# Patient Record
Sex: FEMALE | Race: WHITE | NOT HISPANIC OR LATINO | Employment: UNEMPLOYED | URBAN - METROPOLITAN AREA
[De-identification: names, ages, dates, MRNs, and addresses within clinical notes are randomized per-mention and may not be internally consistent; named-entity substitution may affect disease eponyms.]

---

## 2024-04-17 DIAGNOSIS — Z64.1 GRAND MULTIPARITY: ICD-10-CM

## 2024-04-17 DIAGNOSIS — O10.913 CHRONIC HYPERTENSION COMPLICATING OR REASON FOR CARE DURING PREGNANCY, THIRD TRIMESTER: Primary | ICD-10-CM

## 2024-04-18 ENCOUNTER — OFFICE VISIT (OUTPATIENT)
Dept: MATERNAL FETAL MEDICINE | Facility: CLINIC | Age: 44
End: 2024-04-18
Payer: MEDICAID

## 2024-04-18 ENCOUNTER — PROCEDURE VISIT (OUTPATIENT)
Dept: MATERNAL FETAL MEDICINE | Facility: CLINIC | Age: 44
End: 2024-04-18
Payer: MEDICAID

## 2024-04-18 VITALS
DIASTOLIC BLOOD PRESSURE: 95 MMHG | HEIGHT: 64 IN | HEART RATE: 71 BPM | BODY MASS INDEX: 25.8 KG/M2 | WEIGHT: 151.13 LBS | SYSTOLIC BLOOD PRESSURE: 159 MMHG

## 2024-04-18 DIAGNOSIS — Z87.59 HISTORY OF PRIOR PREGNANCY WITH IUGR NEWBORN: ICD-10-CM

## 2024-04-18 DIAGNOSIS — Z64.1 GRAND MULTIPARITY: ICD-10-CM

## 2024-04-18 DIAGNOSIS — O26.23 RECURRENT PREGNANCY LOSS IN PREGNANT PATIENT IN THIRD TRIMESTER, ANTEPARTUM: ICD-10-CM

## 2024-04-18 DIAGNOSIS — O09.523 MULTIGRAVIDA OF ADVANCED MATERNAL AGE IN THIRD TRIMESTER: Primary | ICD-10-CM

## 2024-04-18 DIAGNOSIS — O10.919 CHRONIC HYPERTENSION AFFECTING PREGNANCY: ICD-10-CM

## 2024-04-18 DIAGNOSIS — O10.913 CHRONIC HYPERTENSION COMPLICATING OR REASON FOR CARE DURING PREGNANCY, THIRD TRIMESTER: ICD-10-CM

## 2024-04-18 PROCEDURE — 3008F BODY MASS INDEX DOCD: CPT | Mod: CPTII,S$GLB,, | Performed by: OBSTETRICS & GYNECOLOGY

## 2024-04-18 PROCEDURE — 3079F DIAST BP 80-89 MM HG: CPT | Mod: CPTII,S$GLB,, | Performed by: OBSTETRICS & GYNECOLOGY

## 2024-04-18 PROCEDURE — 1160F RVW MEDS BY RX/DR IN RCRD: CPT | Mod: CPTII,S$GLB,, | Performed by: OBSTETRICS & GYNECOLOGY

## 2024-04-18 PROCEDURE — 3077F SYST BP >= 140 MM HG: CPT | Mod: CPTII,S$GLB,, | Performed by: OBSTETRICS & GYNECOLOGY

## 2024-04-18 PROCEDURE — 76811 OB US DETAILED SNGL FETUS: CPT | Mod: S$GLB,,, | Performed by: OBSTETRICS & GYNECOLOGY

## 2024-04-18 PROCEDURE — 99204 OFFICE O/P NEW MOD 45 MIN: CPT | Mod: TH,S$GLB,, | Performed by: OBSTETRICS & GYNECOLOGY

## 2024-04-18 PROCEDURE — 1159F MED LIST DOCD IN RCRD: CPT | Mod: CPTII,S$GLB,, | Performed by: OBSTETRICS & GYNECOLOGY

## 2024-04-18 RX ORDER — PRENATAL WITH FERROUS FUM AND FOLIC ACID 3080; 920; 120; 400; 22; 1.84; 3; 20; 10; 1; 12; 200; 27; 25; 2 [IU]/1; [IU]/1; MG/1; [IU]/1; MG/1; MG/1; MG/1; MG/1; MG/1; MG/1; UG/1; MG/1; MG/1; MG/1; MG/1
1 TABLET ORAL DAILY
Qty: 30 TABLET | Refills: 11 | Status: SHIPPED | OUTPATIENT
Start: 2024-04-18 | End: 2024-05-23 | Stop reason: SDUPTHER

## 2024-04-18 RX ORDER — NIFEDIPINE 30 MG/1
30 TABLET, EXTENDED RELEASE ORAL 2 TIMES DAILY
Qty: 60 TABLET | Refills: 3 | Status: SHIPPED | OUTPATIENT
Start: 2024-04-18 | End: 2024-04-22

## 2024-04-18 NOTE — PROGRESS NOTES
MATERNAL-FETAL MEDICINE   CONSULT NOTE    Provider requesting consultation: Dr Coreas    SUBJECTIVE:     Ms. Sonja Hoff is a 43 y.o.  female with IUP at 29w0d who is seen in consultation by M for evaluation and management of:  Problem   - Multigravida of advanced maternal age in third trimester   - Chronic hypertension affecting pregnancy   - Recurrent pregnancy loss in pregnant patient in third trimester, antepartum   - History of prior pregnancy with IUGR      Sonja presents for consultation due to her history of chronic hypertension. [place] [facility] and [place] ago. She has been receiving routine OB and cardiology care while there. She is taking Metoprolol 25mg BID and a low dose aspirin daily. She is also taking Methyldopa 250mg QAM and 500mg QPM (second agent added approx one month ago). She has tried Labetalol in the past, however, it has given her heart palpitations. [facility] a [facility] and [facility]. She is in the process of completing a 24h urine for protein at present. She has had three prior  deliveries due to HTN, but denies pre-eclampsia. She has complaints of visual disturbances (floaters) but reports she has been having these symptoms for years. She does not think metoprolol was started for palpitation symptoms.  43 years old - politely declined biochemical screening.  History of FGR x 3 (HTN)  She has had five previous pregnancy losses (all early SABs). She is unsure of APLS testing has been completed in the past but desires this testing.          Medication List with Changes/Refills   Current Medications    ASPIRIN (ECOTRIN) 81 MG EC TABLET    Take 81 mg by mouth once daily.    CALCIUM-VITAMIN D 250 MG-2.5 MCG (100 UNIT) PER TABLET    Take 1 tablet by mouth 2 (two) times daily.    METHYLDOPA (ALDOMET) 500 MG TABLET    Take 500 mg by mouth 2 (two) times a day.    METOPROLOL TARTRATE  "(LOPRESSOR) 25 MG TABLET    Take 25 mg by mouth 2 (two) times daily.    PRENATAL /IRON/FOLIC ACID (PRENATAL MULTI ORAL)    Take by mouth.    SERTRALINE (ZOLOFT) 50 MG TABLET    Take 50 mg by mouth once daily.       Review of patient's allergies indicates:  No Known Allergies    PMH:  Past Medical History:   Diagnosis Date    Anxiety and depression     Hypertension        PObHx:  OB History    Para Term  AB Living   13 7 4 3 5 7   SAB IAB Ectopic Multiple Live Births   5       7      # Outcome Date GA Lbr Mark/2nd Weight Sex Type Anes PTL Lv   13 Current            12 2022 6w0d       FD   11 SAB 2022 6w0d       FD   10  20 35w0d  2.495 kg (5 lb 8 oz) F Vag-Spont   CRYS   9 SAB 2019 11w0d       FD   8  17 36w0d  2.637 kg (5 lb 13 oz) M Vag-Spont   CRYS   7 SAB 2016 6w0d       FD   6 SAB 10/2016 6w0d       FD   5  14 36w0d  2.58 kg (5 lb 11 oz) F Vag-Spont   CRYS   4 Term 12 37w0d  2.381 kg (5 lb 4 oz) F Vag-Spont   CRYS   3 Term 11 38w0d  3.175 kg (7 lb) F Vag-Spont   CRYS   2 Term 09 39w0d  2.722 kg (6 lb) F Vag-Spont EPI  CRYS   1 Term 08 39w0d  3.629 kg (8 lb) M Vag-Spont   CRYS       PSH:  Past Surgical History:   Procedure Laterality Date    BUNIONECTOMY      TONSILLECTOMY AND ADENOIDECTOMY      WISDOM TOOTH EXTRACTION         Family history:family history includes Diabetes in her father; Hypertension in her brother and mother.    Social history: reports that she has never smoked. She does not have any smokeless tobacco history on file. She reports current alcohol use. She reports that she does not use drugs.    Genetic history: The patient denies any inherited genetic diseases or birth defects in herself or her partner's personal history or family.    Objective:   BP (!) 159/95 (BP Location: Left arm)   Pulse 71   Ht 5' 4" (1.626 m)   Wt 68.6 kg (151 lb 2 oz)   LMP 2023 (Exact Date)   BMI 25.94 kg/m² "     Ultrasound performed. See viewpoint for full ultrasound report.    A detailed fetal anatomic ultrasound examination was performed for the following high risk indication: AMA.   The fetus is cephalic.  No fetal structural malformations are identified; however, fetal imaging is suboptimal for AA/DA.  Fetal size today is consistent with established gestational age with estimated weight at the 44th percentile and abdominal circumference at the 68th percentile.  Cervical length by TA scanning is normal.   Placental location is posterior without evidence of previa.     ASSESSMENT/PLAN:     43 y.o.  female with IUP at 29w0d     - Multigravida of advanced maternal age in third trimester  Today I counseled the patient on the relationship between maternal age and fetal aneuploidy.  We discussed the risks and benefits of screening tests versus definitive genetic testing (amniocentesis). She was counseled about her specific age-related risk of fetal aneuploidy. We discussed the limitations of ultrasound in the definitive diagnosis of fetal aneuploidy.  I quoted the patient a 1 in 900 procedure related risk of fetal loss with genetic amniocentesis.  We also discussed cell free fetal DNA screening including the sensitivity and specificity of the test.  Her questions were answered and after today's consultation she did not want to pursue amniocentesis.  She did not want to pursue NIPT.  Additionally, we discussed the association between advanced maternal age (AMA) and preeclampsia, gestational diabetes, and fetal growth restriction.    Recommendations:  Detailed anatomic survey at 19-20 weeks    Follow-up fetal ultrasound at 32-34 weeks for interval fetal growth  Consider low dose aspirin at 12-16 weeks for preeclampsia risk reduction      - Chronic hypertension affecting pregnancy  Today I counseled the patient on maternal/fetal risks associated with CHTN during pregnancy. Risks include but not limited to fetal growth  restriction, miscarriage, abruption, maternal end organ disease (renal failure, MI, and stroke),  delivery, development of superimposed preeclampsia, and eclampsia. She was counseled on the recommendations for blood pressure control, serial ultrasound for fetal growth assessment and  testing, and timing of delivery. I also counseled her on the recommendation for aspirin 81 mg daily which may decrease her risk of developing superimposed preeclampsia.     She reports seeing floaters, however, this is her norm. She has been experiencing these symptoms for years.    Recommendations ([facility]):  -Initiate aspirin 81 mg daily at 12-16 weeks gestation for preeclampsia risk reduction  -Discontinue Methyldopa. Decrease Metoprolol 25mg from BID to once daily. Add Nifedipine 30mg XL BID  -Baseline evaluation with primary OB:   24-hour urine protein or baseline P/C ratio, CMP, and CBC (has not completed baseline urine protein testing)  Maternal EKG  Maternal ophthalmic evaluation  Maternal echocardiogram if HTN has been long-standing or EKG is abnormal  -Serial fetal growth ultrasounds every 4-6 weeks, beginning at 26-28 weeks.   -Continued close observation of patient's blood pressures. Avoid hypotension as this has been associated with uteroplacental insufficiency.  -In conjunction with the CHAP study recommendation for BP control: If BP is persistently ?140/90 antihypertensive medication is recommended with goal BP of 120-140/80-90.  -Weekly antepartum testing at 32 weeks (NST+AFV); twice weekly testing if control is poor, multiple comorbidities are present, or requires several medications for control   -Delivery timing:  No medications, no comorbid conditions: 39 0/7 - 39 6/7 weeks gestation  No medications, comorbid conditions: 38 0/7 - 38 6/7 weeks gestation  Controlled on single agent, no comorbid conditions: 38 0/7 - 38 6/7 weeks gestation  Controlled on single agent,  comorbid conditions: 37 0/7 - 38 6/7 weeks gestation  Uncontrolled or requiring ? 2 medications: 36 0/7 - 37 6/7 weeks gestation    Comorbid conditions include BMI >= 40, diabetes, and complex medical condition associated with placental dysfunction (ie lupus or other vascular disease)  Delivery may be recommended earlier pending results of fetal growth ultrasounds, AFV assessment, or antepartum testing results.        - Recurrent pregnancy loss in pregnant patient in third trimester, antepartum  Miscarriage is the most common complication of early pregnancy. An estimated 8-20% of clinically recognized pregnancies less than 20 weeks of gestation will miscarry. 80% of miscarriages happen in the first 12 weeks of gestation. Chromosomal abnormalities account for approximately 50% of all miscarriages and the earlier the gestational age at miscarriage the higher the incidence. Other etiologies for miscarriage include congenital anomalies, maternal uterine anomalies, poorly controlled thyroid disease or diabetes, and acute maternal infection. Some miscarriages are unexplained. Recurrent pregnancy loss (RPL) refers to three or more consecutive losses of clinically recognized pregnancies prior to 20 weeks of gestation. While approximately 15% of women experience 1 miscarriage, only 2% experience 2 consecutive losses and less than 1% experience 3 consecutive losses.   In women with a history of two or more prior losses, the chance of a viable birth after ultrasound  detection of fetal cardiac activity is approximately 77%. ?  After evaluation, recurrent pregnancy loss remains unexplained in approximately one-half of couples. Nevertheless, the chance of a live birth after three unexplained RPLs is over 50 percent with no intervention.     Recommendations:  Evaluation for possible antiphospholipid antibody syndrome with anticardiolipin antibody (IgG and IgM) titer, lupus anticoagulant, and beta-2 glycoprotein IgG and IgM titers  - order providedAn inherited thrombophilia panel is not recommended for RPLEvaluation of thyroid function with TSH and for maternal diabetes with 2 hour glucose tolerance test or hemoglobin A1C  Please re-consult us should any of these be abnormal.      - History of prior pregnancy with IUGR   I reviewed the patient's obstetric history which is remarkable for a previous pregnancy complicated by FGR. Other conditions noted during that pregnancy include: maternal history of hypertension. I counseled the patient regarding the recurrence risk for FGR (however the exact number is not clear, likely ~15-20% recurrence.). We discussed recommendations for management during this pregnancy including evaluation of fetal growth in the third trimester. Low molecular weight heparin and aspirin have not been beneficial in preventing recurrent FGR. If the patient had preeclampsia or gestational hypertension associated with FGR, low dose aspirin therapy should be initiated at 12-16 weeks in subsequent pregnancies. Patient was counseled on modifiable risk factors including tobacco cessation, abstinence from alcohol and drug use, and maternal diet (ensuring appropriate gestational weight gain).     Recommendations:  -Fetal growth ultrasounds in third trimester, every 4 weeks  -Antepartum surveillance is not indicated in absence of FGR diagnosis or other maternal-fetal indications for prenatal testing  -Monitor gestational weight gain, encourage prenatal vitamin        FOLLOW UP:   F/u in 2 weeks for BP check  F/u in 4 weeks for US/MFM visit    This consultation was completed with the assistance of Raya Bowles NP.        Jaimee Hwang MD  Maternal Fetal Medicine

## 2024-04-18 NOTE — ASSESSMENT & PLAN NOTE
Today I counseled the patient on maternal/fetal risks associated with CHTN during pregnancy. Risks include but not limited to fetal growth restriction, miscarriage, abruption, maternal end organ disease (renal failure, MI, and stroke),  delivery, development of superimposed preeclampsia, and eclampsia. She was counseled on the recommendations for blood pressure control, serial ultrasound for fetal growth assessment and  testing, and timing of delivery. I also counseled her on the recommendation for aspirin 81 mg daily which may decrease her risk of developing superimposed preeclampsia.     She reports seeing floaters, however, this is her norm. She has been experiencing these symptoms for years.    Recommendations (Please refer to [facility] guidelines):  -Initiate aspirin 81 mg daily at 12-16 weeks gestation for preeclampsia risk reduction  -Discontinue Methyldopa. Decrease Metoprolol 25mg from BID to once daily. Add Nifedipine 30mg XL BID  -Baseline evaluation with primary OB:   24-hour urine protein or baseline P/C ratio, CMP, and CBC (has not completed baseline urine protein testing)  Maternal EKG  Maternal ophthalmic evaluation  Maternal echocardiogram if HTN has been long-standing or EKG is abnormal  -Serial fetal growth ultrasounds every 4-6 weeks, beginning at 26-28 weeks.   -Continued close observation of patient's blood pressures. Avoid hypotension as this has been associated with uteroplacental insufficiency.  -In conjunction with the CHAP study recommendation for BP control: If BP is persistently ?140/90 antihypertensive medication is recommended with goal BP of 120-140/80-90.  -Weekly antepartum testing at 32 weeks (NST+AFV); twice weekly testing if control is poor, multiple comorbidities are present, or requires several medications for control   -Delivery timing:  No medications, no comorbid conditions: 39 0/7 - 39 6/7 weeks gestation  No medications, comorbid conditions: 38 0/7 - 38  6/7 weeks gestation  Controlled on single agent, no comorbid conditions: 38 0/7 - 38 6/7 weeks gestation  Controlled on single agent, comorbid conditions: 37 0/7 - 38 6/7 weeks gestation  Uncontrolled or requiring ? 2 medications: 36 0/7 - 37 6/7 weeks gestation    Comorbid conditions include BMI >= 40, diabetes, and complex medical condition associated with placental dysfunction (ie lupus or other vascular disease)  Delivery may be recommended earlier pending results of fetal growth ultrasounds, AFV assessment, or antepartum testing results.

## 2024-04-18 NOTE — ASSESSMENT & PLAN NOTE
I reviewed the patient's obstetric history which is remarkable for a previous pregnancy complicated by FGR. Other conditions noted during that pregnancy include: maternal history of hypertension. I counseled the patient regarding the recurrence risk for FGR (however the exact number is not clear, likely ~15-20% recurrence.). We discussed recommendations for management during this pregnancy including evaluation of fetal growth in the third trimester. Low molecular weight heparin and aspirin have not been beneficial in preventing recurrent FGR. If the patient had preeclampsia or gestational hypertension associated with FGR, low dose aspirin therapy should be initiated at 12-16 weeks in subsequent pregnancies. Patient was counseled on modifiable risk factors including tobacco cessation, abstinence from alcohol and drug use, and maternal diet (ensuring appropriate gestational weight gain).     Recommendations:  -Fetal growth ultrasounds in third trimester, every 4 weeks  -Antepartum surveillance is not indicated in absence of FGR diagnosis or other maternal-fetal indications for prenatal testing  -Monitor gestational weight gain, encourage prenatal vitamin

## 2024-04-18 NOTE — ASSESSMENT & PLAN NOTE
Today I counseled the patient on the relationship between maternal age and fetal aneuploidy.  We discussed the risks and benefits of screening tests versus definitive genetic testing (amniocentesis). She was counseled about her specific age-related risk of fetal aneuploidy. We discussed the limitations of ultrasound in the definitive diagnosis of fetal aneuploidy.  I quoted the patient a 1 in 900 procedure related risk of fetal loss with genetic amniocentesis.  We also discussed cell free fetal DNA screening including the sensitivity and specificity of the test.  Her questions were answered and after today's consultation she did not want to pursue amniocentesis.  She did not want to pursue NIPT.  Additionally, we discussed the association between advanced maternal age (AMA) and preeclampsia, gestational diabetes, and fetal growth restriction.    Recommendations:  Detailed anatomic survey at 19-20 weeks    Follow-up fetal ultrasound at 32-34 weeks for interval fetal growth  Consider low dose aspirin at 12-16 weeks for preeclampsia risk reduction

## 2024-04-18 NOTE — ASSESSMENT & PLAN NOTE
Miscarriage is the most common complication of early pregnancy. An estimated 8-20% of clinically recognized pregnancies less than 20 weeks of gestation will miscarry. 80% of miscarriages happen in the first 12 weeks of gestation. Chromosomal abnormalities account for approximately 50% of all miscarriages and the earlier the gestational age at miscarriage the higher the incidence. Other etiologies for miscarriage include congenital anomalies, maternal uterine anomalies, poorly controlled thyroid disease or diabetes, and acute maternal infection. Some miscarriages are unexplained. Recurrent pregnancy loss (RPL) refers to three or more consecutive losses of clinically recognized pregnancies prior to 20 weeks of gestation. While approximately 15% of women experience 1 miscarriage, only 2% experience 2 consecutive losses and less than 1% experience 3 consecutive losses.   In women with a history of two or more prior losses, the chance of a viable birth after ultrasound  detection of fetal cardiac activity is approximately 77%. ?  After evaluation, recurrent pregnancy loss remains unexplained in approximately one-half of couples. Nevertheless, the chance of a live birth after three unexplained RPLs is over 50 percent with no intervention.     Recommendations:  Evaluation for possible antiphospholipid antibody syndrome with anticardiolipin antibody (IgG and IgM) titer, lupus anticoagulant, and beta-2 glycoprotein IgG and IgM titers - order providedAn inherited thrombophilia panel is not recommended for RPLEvaluation of thyroid function with TSH and for maternal diabetes with 2 hour glucose tolerance test or hemoglobin A1C  Please re-consult us should any of these be abnormal.

## 2024-04-22 ENCOUNTER — TELEPHONE (OUTPATIENT)
Dept: MATERNAL FETAL MEDICINE | Facility: CLINIC | Age: 44
End: 2024-04-22
Payer: MEDICAID

## 2024-04-22 DIAGNOSIS — O16.3 HYPERTENSION AFFECTING PREGNANCY IN THIRD TRIMESTER: Primary | ICD-10-CM

## 2024-04-22 RX ORDER — NIFEDIPINE 60 MG/1
60 TABLET, EXTENDED RELEASE ORAL 2 TIMES DAILY
Qty: 60 TABLET | Refills: 3 | Status: ON HOLD | OUTPATIENT
Start: 2024-04-22 | End: 2024-04-25 | Stop reason: DRUGHIGH

## 2024-04-22 RX ORDER — HYDRALAZINE HYDROCHLORIDE 10 MG/1
10 TABLET, FILM COATED ORAL 3 TIMES DAILY
Qty: 90 TABLET | Refills: 2 | Status: ON HOLD | OUTPATIENT
Start: 2024-04-22 | End: 2024-04-25 | Stop reason: DRUGHIGH

## 2024-04-22 NOTE — TELEPHONE ENCOUNTER
Pt called to discuss Bps since switching HTN regimen  Over the weekend, her Bps have been elevated since switching from Methyldopa to Nifedipine. Her mother is a nurse and instructed her to increase Metoprolol from 25mg once daily to twice daily. She also took Nifedipine 30mg XL TID yesterday per her mother's advice.   Recent pre-e work up normal (24h-203mg protein)  Dr Hwang talked to Sonja on the phone and discussed Bps and new recommended regimen  Per Dr Hwang: Take Metoprolol 25mg BID (has enough Rx at home), increase Nifedipine to 60mg BID (will send new Rx), and add Hydralazine 10mg TID (will send new rx). Patient verbalized understanding. Will call our office tomorrow morning with report on blood pressures for further recs.

## 2024-04-23 ENCOUNTER — HOSPITAL ENCOUNTER (OUTPATIENT)
Facility: HOSPITAL | Age: 44
Discharge: HOME | End: 2024-04-25
Attending: OBSTETRICS & GYNECOLOGY | Admitting: OBSTETRICS & GYNECOLOGY
Payer: MEDICAID

## 2024-04-23 DIAGNOSIS — O10.919 CHRONIC HYPERTENSION DURING PREGNANCY: ICD-10-CM

## 2024-04-23 LAB
ALBUMIN SERPL-MCNC: 3.4 G/DL (ref 3.5–5)
ALBUMIN/GLOB SERPL: 1.1 RATIO (ref 1.1–2)
ALP SERPL-CCNC: 105 UNIT/L (ref 40–150)
ALT SERPL-CCNC: 11 UNIT/L (ref 0–55)
AST SERPL-CCNC: 16 UNIT/L (ref 5–34)
BILIRUB SERPL-MCNC: 0.7 MG/DL
BUN SERPL-MCNC: 6.6 MG/DL (ref 7–18.7)
CALCIUM SERPL-MCNC: 9 MG/DL (ref 8.4–10.2)
CHLORIDE SERPL-SCNC: 106 MMOL/L (ref 98–107)
CO2 SERPL-SCNC: 21 MMOL/L (ref 22–29)
CREAT SERPL-MCNC: 0.56 MG/DL (ref 0.55–1.02)
CREAT UR-MCNC: 38.7 MG/DL (ref 45–106)
ERYTHROCYTE [DISTWIDTH] IN BLOOD BY AUTOMATED COUNT: 13.3 % (ref 11.5–17)
GFR SERPLBLD CREATININE-BSD FMLA CKD-EPI: >60 MLS/MIN/1.73/M2
GLOBULIN SER-MCNC: 3.2 GM/DL (ref 2.4–3.5)
GLUCOSE SERPL-MCNC: 96 MG/DL (ref 74–100)
GROUP & RH: NORMAL
HCT VFR BLD AUTO: 40.7 % (ref 37–47)
HGB BLD-MCNC: 14.1 G/DL (ref 12–16)
INDIRECT COOMBS: NORMAL
MCH RBC QN AUTO: 31.5 PG (ref 27–31)
MCHC RBC AUTO-ENTMCNC: 34.6 G/DL (ref 33–36)
MCV RBC AUTO: 91.1 FL (ref 80–94)
NRBC BLD AUTO-RTO: 0 %
PLATELET # BLD AUTO: 278 X10(3)/MCL (ref 130–400)
PMV BLD AUTO: 9.6 FL (ref 7.4–10.4)
POTASSIUM SERPL-SCNC: 4 MMOL/L (ref 3.5–5.1)
PROT SERPL-MCNC: 6.6 GM/DL (ref 6.4–8.3)
PROT UR STRIP-MCNC: <6.8 MG/DL
RBC # BLD AUTO: 4.47 X10(6)/MCL (ref 4.2–5.4)
SODIUM SERPL-SCNC: 137 MMOL/L (ref 136–145)
SPECIMEN OUTDATE: NORMAL
WBC # SPEC AUTO: 14.48 X10(3)/MCL (ref 4.5–11.5)

## 2024-04-23 PROCEDURE — 63600175 PHARM REV CODE 636 W HCPCS: Performed by: OBSTETRICS & GYNECOLOGY

## 2024-04-23 PROCEDURE — 25000003 PHARM REV CODE 250: Performed by: STUDENT IN AN ORGANIZED HEALTH CARE EDUCATION/TRAINING PROGRAM

## 2024-04-23 PROCEDURE — 82570 ASSAY OF URINE CREATININE: CPT | Performed by: OBSTETRICS & GYNECOLOGY

## 2024-04-23 PROCEDURE — 25000003 PHARM REV CODE 250: Performed by: OBSTETRICS & GYNECOLOGY

## 2024-04-23 PROCEDURE — 86850 RBC ANTIBODY SCREEN: CPT | Performed by: OBSTETRICS & GYNECOLOGY

## 2024-04-23 PROCEDURE — 80053 COMPREHEN METABOLIC PANEL: CPT | Performed by: OBSTETRICS & GYNECOLOGY

## 2024-04-23 PROCEDURE — 85027 COMPLETE CBC AUTOMATED: CPT | Performed by: OBSTETRICS & GYNECOLOGY

## 2024-04-23 PROCEDURE — 59025 FETAL NON-STRESS TEST: CPT

## 2024-04-23 PROCEDURE — 96372 THER/PROPH/DIAG INJ SC/IM: CPT

## 2024-04-23 PROCEDURE — 25000003 PHARM REV CODE 250: Performed by: NURSE PRACTITIONER

## 2024-04-23 RX ORDER — LABETALOL HCL 20 MG/4 ML
40 SYRINGE (ML) INTRAVENOUS ONCE AS NEEDED
Status: DISCONTINUED | OUTPATIENT
Start: 2024-04-23 | End: 2024-04-23

## 2024-04-23 RX ORDER — HYDRALAZINE HYDROCHLORIDE 20 MG/ML
5 INJECTION INTRAMUSCULAR; INTRAVENOUS ONCE AS NEEDED
Status: COMPLETED | OUTPATIENT
Start: 2024-04-23 | End: 2024-04-23

## 2024-04-23 RX ORDER — HYDRALAZINE HYDROCHLORIDE 20 MG/ML
10 INJECTION INTRAMUSCULAR; INTRAVENOUS ONCE AS NEEDED
Status: DISCONTINUED | OUTPATIENT
Start: 2024-04-23 | End: 2024-04-23

## 2024-04-23 RX ORDER — NIFEDIPINE 60 MG/1
60 TABLET, EXTENDED RELEASE ORAL 2 TIMES DAILY
Status: DISCONTINUED | OUTPATIENT
Start: 2024-04-23 | End: 2024-04-25

## 2024-04-23 RX ORDER — LABETALOL HCL 20 MG/4 ML
80 SYRINGE (ML) INTRAVENOUS ONCE AS NEEDED
Status: DISCONTINUED | OUTPATIENT
Start: 2024-04-23 | End: 2024-04-25 | Stop reason: HOSPADM

## 2024-04-23 RX ORDER — LABETALOL HCL 20 MG/4 ML
40 SYRINGE (ML) INTRAVENOUS ONCE AS NEEDED
Status: DISCONTINUED | OUTPATIENT
Start: 2024-04-23 | End: 2024-04-25 | Stop reason: HOSPADM

## 2024-04-23 RX ORDER — METOPROLOL TARTRATE 25 MG/1
25 TABLET, FILM COATED ORAL 2 TIMES DAILY
Status: DISCONTINUED | OUTPATIENT
Start: 2024-04-23 | End: 2024-04-25 | Stop reason: HOSPADM

## 2024-04-23 RX ORDER — HYDRALAZINE HYDROCHLORIDE 10 MG/1
10 TABLET, FILM COATED ORAL EVERY 8 HOURS
Status: DISCONTINUED | OUTPATIENT
Start: 2024-04-23 | End: 2024-04-23

## 2024-04-23 RX ORDER — LABETALOL HCL 20 MG/4 ML
20 SYRINGE (ML) INTRAVENOUS ONCE AS NEEDED
Status: DISCONTINUED | OUTPATIENT
Start: 2024-04-23 | End: 2024-04-25 | Stop reason: HOSPADM

## 2024-04-23 RX ORDER — BETAMETHASONE SODIUM PHOSPHATE AND BETAMETHASONE ACETATE 3; 3 MG/ML; MG/ML
12 INJECTION, SUSPENSION INTRA-ARTICULAR; INTRALESIONAL; INTRAMUSCULAR; SOFT TISSUE
Status: COMPLETED | OUTPATIENT
Start: 2024-04-23 | End: 2024-04-24

## 2024-04-23 RX ORDER — HYDRALAZINE HYDROCHLORIDE 10 MG/1
10 TABLET, FILM COATED ORAL EVERY 6 HOURS
Status: DISCONTINUED | OUTPATIENT
Start: 2024-04-24 | End: 2024-04-24

## 2024-04-23 RX ORDER — NIFEDIPINE 10 MG/1
10 CAPSULE ORAL ONCE
Status: COMPLETED | OUTPATIENT
Start: 2024-04-23 | End: 2024-04-23

## 2024-04-23 RX ORDER — LABETALOL HCL 20 MG/4 ML
80 SYRINGE (ML) INTRAVENOUS ONCE AS NEEDED
Status: DISCONTINUED | OUTPATIENT
Start: 2024-04-23 | End: 2024-04-23

## 2024-04-23 RX ORDER — HYDRALAZINE HYDROCHLORIDE 20 MG/ML
10 INJECTION INTRAMUSCULAR; INTRAVENOUS ONCE AS NEEDED
Status: COMPLETED | OUTPATIENT
Start: 2024-04-23 | End: 2024-04-24

## 2024-04-23 RX ORDER — LABETALOL HCL 20 MG/4 ML
20 SYRINGE (ML) INTRAVENOUS ONCE AS NEEDED
Status: DISCONTINUED | OUTPATIENT
Start: 2024-04-23 | End: 2024-04-23

## 2024-04-23 RX ORDER — ACETAMINOPHEN 325 MG/1
650 TABLET ORAL EVERY 6 HOURS PRN
Status: DISCONTINUED | OUTPATIENT
Start: 2024-04-23 | End: 2024-04-25 | Stop reason: HOSPADM

## 2024-04-23 RX ADMIN — NIFEDIPINE 10 MG: 10 CAPSULE ORAL at 05:04

## 2024-04-23 RX ADMIN — METOPROLOL TARTRATE 25 MG: 25 TABLET, FILM COATED ORAL at 08:04

## 2024-04-23 RX ADMIN — BETAMETHASONE ACETATE AND BETAMETHASONE SODIUM PHOSPHATE 12 MG: 3; 3 INJECTION, SUSPENSION INTRA-ARTICULAR; INTRALESIONAL; INTRAMUSCULAR; SOFT TISSUE at 11:04

## 2024-04-23 RX ADMIN — NIFEDIPINE 60 MG: 60 TABLET, FILM COATED, EXTENDED RELEASE ORAL at 08:04

## 2024-04-23 RX ADMIN — HYDRALAZINE HYDROCHLORIDE 5 MG: 20 INJECTION INTRAMUSCULAR; INTRAVENOUS at 05:04

## 2024-04-23 RX ADMIN — HYDRALAZINE HYDROCHLORIDE 10 MG: 10 TABLET ORAL at 11:04

## 2024-04-23 RX ADMIN — ACETAMINOPHEN 650 MG: 325 TABLET, FILM COATED ORAL at 10:04

## 2024-04-23 NOTE — PLAN OF CARE
Problem: Adult Inpatient Plan of Care  Goal: Plan of Care Review  Outcome: Progressing  Goal: Patient-Specific Goal (Individualized)  Outcome: Progressing  Goal: Absence of Hospital-Acquired Illness or Injury  Outcome: Progressing  Goal: Optimal Comfort and Wellbeing  Outcome: Progressing  Goal: Readiness for Transition of Care  Outcome: Progressing     Problem:  Fall Injury Risk  Goal: Absence of Fall, Infant Drop and Related Injury  Outcome: Progressing

## 2024-04-24 DIAGNOSIS — O16.3 HYPERTENSION AFFECTING PREGNANCY IN THIRD TRIMESTER: Primary | ICD-10-CM

## 2024-04-24 PROCEDURE — 25000003 PHARM REV CODE 250: Performed by: OBSTETRICS & GYNECOLOGY

## 2024-04-24 PROCEDURE — G0378 HOSPITAL OBSERVATION PER HR: HCPCS

## 2024-04-24 PROCEDURE — 96372 THER/PROPH/DIAG INJ SC/IM: CPT | Performed by: OBSTETRICS & GYNECOLOGY

## 2024-04-24 PROCEDURE — 63600175 PHARM REV CODE 636 W HCPCS: Performed by: OBSTETRICS & GYNECOLOGY

## 2024-04-24 PROCEDURE — 27000492 HC SLEEVE, SCD T/L

## 2024-04-24 PROCEDURE — 99223 1ST HOSP IP/OBS HIGH 75: CPT | Mod: ,,, | Performed by: OBSTETRICS & GYNECOLOGY

## 2024-04-24 PROCEDURE — 25000003 PHARM REV CODE 250: Performed by: NURSE PRACTITIONER

## 2024-04-24 PROCEDURE — 63600175 PHARM REV CODE 636 W HCPCS

## 2024-04-24 PROCEDURE — 25000003 PHARM REV CODE 250: Performed by: STUDENT IN AN ORGANIZED HEALTH CARE EDUCATION/TRAINING PROGRAM

## 2024-04-24 RX ORDER — NIFEDIPINE 10 MG/1
10 CAPSULE ORAL ONCE
Status: COMPLETED | OUTPATIENT
Start: 2024-04-24 | End: 2024-04-24

## 2024-04-24 RX ORDER — HYDRALAZINE HYDROCHLORIDE 25 MG/1
25 TABLET, FILM COATED ORAL EVERY 6 HOURS
Status: DISCONTINUED | OUTPATIENT
Start: 2024-04-24 | End: 2024-04-25 | Stop reason: HOSPADM

## 2024-04-24 RX ORDER — CALCIUM GLUCONATE 98 MG/ML
1 INJECTION, SOLUTION INTRAVENOUS
Status: DISCONTINUED | OUTPATIENT
Start: 2024-04-24 | End: 2024-04-25 | Stop reason: HOSPADM

## 2024-04-24 RX ORDER — PROMETHAZINE HYDROCHLORIDE 12.5 MG/1
12.5 TABLET ORAL EVERY 6 HOURS PRN
Status: DISCONTINUED | OUTPATIENT
Start: 2024-04-24 | End: 2024-04-25 | Stop reason: HOSPADM

## 2024-04-24 RX ORDER — HYDRALAZINE HYDROCHLORIDE 20 MG/ML
INJECTION INTRAMUSCULAR; INTRAVENOUS
Status: COMPLETED
Start: 2024-04-24 | End: 2024-04-24

## 2024-04-24 RX ORDER — DIPHENHYDRAMINE HYDROCHLORIDE 50 MG/ML
25 INJECTION INTRAMUSCULAR; INTRAVENOUS ONCE
Status: DISCONTINUED | OUTPATIENT
Start: 2024-04-24 | End: 2024-04-25 | Stop reason: HOSPADM

## 2024-04-24 RX ORDER — HYDRALAZINE HYDROCHLORIDE 20 MG/ML
5 INJECTION INTRAMUSCULAR; INTRAVENOUS ONCE AS NEEDED
Status: COMPLETED | OUTPATIENT
Start: 2024-04-24 | End: 2024-04-24

## 2024-04-24 RX ORDER — SODIUM CHLORIDE, SODIUM LACTATE, POTASSIUM CHLORIDE, CALCIUM CHLORIDE 600; 310; 30; 20 MG/100ML; MG/100ML; MG/100ML; MG/100ML
1000 INJECTION, SOLUTION INTRAVENOUS CONTINUOUS
Status: DISCONTINUED | OUTPATIENT
Start: 2024-04-24 | End: 2024-04-25 | Stop reason: HOSPADM

## 2024-04-24 RX ORDER — CALCIUM CARBONATE 200(500)MG
500 TABLET,CHEWABLE ORAL 3 TIMES DAILY PRN
Status: DISCONTINUED | OUTPATIENT
Start: 2024-04-24 | End: 2024-04-25 | Stop reason: HOSPADM

## 2024-04-24 RX ORDER — MAGNESIUM SULFATE HEPTAHYDRATE 40 MG/ML
2 INJECTION, SOLUTION INTRAVENOUS CONTINUOUS
Status: DISCONTINUED | OUTPATIENT
Start: 2024-04-24 | End: 2024-04-25 | Stop reason: HOSPADM

## 2024-04-24 RX ORDER — MAGNESIUM SULFATE HEPTAHYDRATE 40 MG/ML
6 INJECTION, SOLUTION INTRAVENOUS ONCE
Status: DISCONTINUED | OUTPATIENT
Start: 2024-04-24 | End: 2024-04-25 | Stop reason: HOSPADM

## 2024-04-24 RX ADMIN — BETAMETHASONE ACETATE AND BETAMETHASONE SODIUM PHOSPHATE 12 MG: 3; 3 INJECTION, SUSPENSION INTRA-ARTICULAR; INTRALESIONAL; INTRAMUSCULAR; SOFT TISSUE at 11:04

## 2024-04-24 RX ADMIN — HYDRALAZINE HYDROCHLORIDE 25 MG: 25 TABLET ORAL at 05:04

## 2024-04-24 RX ADMIN — HYDRALAZINE HYDROCHLORIDE 25 MG: 25 TABLET ORAL at 12:04

## 2024-04-24 RX ADMIN — NIFEDIPINE 10 MG: 10 CAPSULE ORAL at 05:04

## 2024-04-24 RX ADMIN — HYDRALAZINE HYDROCHLORIDE 10 MG: 20 INJECTION INTRAMUSCULAR; INTRAVENOUS at 04:04

## 2024-04-24 RX ADMIN — NIFEDIPINE 60 MG: 60 TABLET, FILM COATED, EXTENDED RELEASE ORAL at 09:04

## 2024-04-24 RX ADMIN — PROMETHAZINE HYDROCHLORIDE 12.5 MG: 12.5 TABLET ORAL at 09:04

## 2024-04-24 RX ADMIN — ACETAMINOPHEN 650 MG: 325 TABLET, FILM COATED ORAL at 09:04

## 2024-04-24 RX ADMIN — HYDRALAZINE HYDROCHLORIDE 5 MG: 20 INJECTION INTRAMUSCULAR; INTRAVENOUS at 04:04

## 2024-04-24 RX ADMIN — ACETAMINOPHEN 650 MG: 325 TABLET, FILM COATED ORAL at 05:04

## 2024-04-24 RX ADMIN — METOPROLOL TARTRATE 25 MG: 25 TABLET, FILM COATED ORAL at 09:04

## 2024-04-24 RX ADMIN — CALCIUM CARBONATE (ANTACID) CHEW TAB 500 MG 500 MG: 500 CHEW TAB at 05:04

## 2024-04-24 RX ADMIN — ACETAMINOPHEN 650 MG: 325 TABLET, FILM COATED ORAL at 04:04

## 2024-04-24 RX ADMIN — HYDRALAZINE HYDROCHLORIDE 10 MG: 10 TABLET ORAL at 06:04

## 2024-04-24 NOTE — PROGRESS NOTES
Consultation Note  Maternal Fetal Medicine          Subjective:         Sonja Hoff is a 43 y.o.  female with IUP at 29w6d with significant history of CHTN and PTD who is admitted for worsening Bps needing medication adjustment.    Sonja was admitted yesterday due to persistently severe Bps in clinic with Dr. Coreas. 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  We adjusted her regimen to nifedipine and metoprolol and during that transition she began having more significantly elevated BP. Due to the level of her pressures, she was advised to be admitted for adjustment. Labs have been normal.     She did not sleep well 2/2 to steroid effect and restless legs. She had severe Bps this AM and was given hydralazine protocol and PO nifedipine prior to resolution. I ordered magnesium sulfate but patient asked we delay as she thinks her agitation is causing worse Bps. No s/s pre-eclampsia.       PMHx:   Past Medical History:   Diagnosis Date    Anxiety and depression     Hypertension     Mitral valve prolapse        PSHx:   Past Surgical History:   Procedure Laterality Date    BUNIONECTOMY      TONSILLECTOMY AND ADENOIDECTOMY      WISDOM TOOTH EXTRACTION         All: Review of patient's allergies indicates:  No Known Allergies    Meds:   Medications Prior to Admission   Medication Sig Dispense Refill Last Dose    aspirin (ECOTRIN) 81 MG EC tablet Take 81 mg by mouth once daily.   2024    hydrALAZINE (APRESOLINE) 10 MG tablet Take 1 tablet (10 mg total) by mouth 3 (three) times daily. 90 tablet 2 2024    metoprolol tartrate (LOPRESSOR) 25 MG tablet Take 25 mg by mouth 2 (two) times daily.   2024    NIFEdipine (PROCARDIA-XL) 60 MG (OSM) 24 hr tablet Take 1 tablet (60 mg total) by mouth 2 (two) times daily. 60 tablet 3 2024    PNV,calcium 72/iron/folic acid (PRENATAL VITAMIN) Tab Take 1 tablet by mouth once daily. 30 tablet 11  4/23/2024    sertraline (ZOLOFT) 50 MG tablet Take 50 mg by mouth once daily.   4/23/2024    calcium-vitamin D 250 mg-2.5 mcg (100 unit) per tablet Take 1 tablet by mouth 2 (two) times daily. (Patient not taking: Reported on 4/23/2024)       methyldopa (ALDOMET) 500 MG tablet Take 500 mg by mouth 2 (two) times a day.       prenatal no122/iron/folic acid (PRENATAL MULTI ORAL) Take by mouth. (Patient not taking: Reported on 4/23/2024)          SH:   Social History     Socioeconomic History    Marital status:      Spouse name: Dale Hoff   Tobacco Use    Smoking status: Never   Substance and Sexual Activity    Alcohol use: Yes     Comment: red wine once a month    Drug use: Never    Sexual activity: Yes     Partners: Male     Social Determinants of Health     Financial Resource Strain: Low Risk  (9/13/2020)    Received from [facility] and Its Subsidiaries and Affiliates    Overall Financial Resource Strain (CARDIA)     Difficulty of Paying Living Expenses: Not hard at all   Food Insecurity: No Food Insecurity (9/13/2020)    Received from [facility] and Its Subsidiaries and Affiliates    Hunger Vital Sign     Worried About Running Out of Food in the Last Year: Never true     Ran Out of Food in the Last Year: Never true   Transportation Needs: No Transportation Needs (9/13/2020)    Received from [facility] and Its Subsidiaries and Affiliates    PRAPARE - Transportation     Lack of Transportation (Medical): No     Lack of Transportation (Non-Medical): No   Physical Activity: Unknown (9/13/2020)    Received from [facility] and Its Subsidiaries and Affiliates    Exercise Vital Sign     Days of Exercise per Week: Patient declined     Minutes of Exercise per Session: Patient declined   Stress: No Stress Concern Present (9/13/2020)    Received from [facility] and Its Subsidiaries and Affiliates    [place] [employer] - Occupational Stress Questionnaire     Feeling of Stress : Not at all   Social Connections: Unknown (2020)    Received from [facility] and Its Subsidiaries and Affiliates    Social Connection and Isolation Panel [NHANES]     Frequency of Communication with Friends and Family: Patient declined     Frequency of Social Gatherings with Friends and Family: Patient declined     Attends Baptism Services: Patient declined     Active Member of Clubs or Organizations: Patient declined     Attends Club or Organization Meetings: Patient declined     Marital Status: Patient declined       FH:   Family History   Problem Relation Name Age of Onset    Diabetes Father      Hypertension Mother      Hypertension Brother         OBHx:   OB History    Para Term  AB Living   13 7 4 3 5 7   SAB IAB Ectopic Multiple Live Births   5 0 0 0 7      # Outcome Date GA Lbr Mark/2nd Weight Sex Type Anes PTL Lv   13 Current            12 2022 6w0d       FD   11 SAB 2022 6w0d       FD   10  20 35w0d  2.495 kg (5 lb 8 oz) F Vag-Spont   CRYS      Name: Priyanka   9 SAB 2019 11w0d       FD   8  17 36w0d  2.637 kg (5 lb 13 oz) M Vag-Spont   CRYS      Name: Edwin   7 SAB 2016 6w0d       FD   6 SAB 10/2016 6w0d       FD   5  14 36w0d  2.58 kg (5 lb 11 oz) F Vag-Spont   CRYS      Name: Ramona   4 Term 12 37w0d  2.381 kg (5 lb 4 oz) F Vag-Spont   CRYS      Name: Barbie   3 Term 11 38w0d  3.175 kg (7 lb) F Vag-Spont   CRYS      Name: Sommer   2 Term 09 39w0d  2.722 kg (6 lb) F Vag-Spont EPI  CRYS      Name: Preeti   1 Term 08 39w0d  3.629 kg (8 lb) M Vag-Spont   CRYS      Name: Yamil       Objective:         Temp:  [98.4 °F (36.9 °C)-98.6 °F (37 °C)] 98.4 °F (36.9 °C)  Pulse:  [] 111  Resp:  [16-18] 18  SpO2:   [96 %-100 %] 100 %  BP: (123-169)/() 136/89    Gen: NAD, A&Ox3  Pulm: Unlabored breathing, LCTAB  Card: RRR  Abd: FHT present, soft, nondistended, nontender to palpation, gravid uterus palpable c/w gestational age  Extremities: Palpable peripheral pulses, no pedal edema,  DTRs x 4    NST: 130 baseline, moderate BTBV, pos accelerations, neg decelerations  [place]: Quiet mins  US:   4/18: A detailed fetal anatomic ultrasound examination was performed for the following high risk indication: AMA.   The fetus is cephalic.  No fetal structural malformations are identified; however, fetal imaging is suboptimal for AA/DA.  Fetal size today is consistent with established gestational age with estimated weight at the 44th percentile and abdominal circumference at the 68th percentile.  Cervical length by TA scanning is normal.   Placental location is posterior without evidence of previa.     Lab Review  Blood Type: O POS  Per OB    Recent Results (from the past 24 hour(s))   CBC Without Differential    Collection Time: 04/23/24 11:12 AM   Result Value Ref Range    WBC 14.48 (H) 4.50 - 11.50 x10(3)/mcL    RBC 4.47 4.20 - 5.40 x10(6)/mcL    Hgb 14.1 12.0 - 16.0 g/dL    Hct 40.7 37.0 - 47.0 %    MCV 91.1 80.0 - 94.0 fL    MCH 31.5 (H) 27.0 - 31.0 pg    MCHC 34.6 33.0 - 36.0 g/dL    RDW 13.3 11.5 - 17.0 %    Platelet 278 130 - 400 x10(3)/mcL    MPV 9.6 7.4 - 10.4 fL    NRBC% 0.0 %   Comprehensive Metabolic Panel    Collection Time: 04/23/24 11:12 AM   Result Value Ref Range    Sodium Level 137 136 - 145 mmol/L    Potassium Level 4.0 3.5 - 5.1 mmol/L    Chloride 106 98 - 107 mmol/L    Carbon Dioxide 21 (L) 22 - 29 mmol/L    Glucose Level 96 74 - 100 mg/dL    Blood Urea Nitrogen 6.6 (L) 7.0 - 18.7 mg/dL    Creatinine 0.56 0.55 - 1.02 mg/dL    Calcium Level Total 9.0 8.4 - 10.2 mg/dL    Protein Total 6.6 6.4 - 8.3 gm/dL    Albumin Level 3.4 (L) 3.5 - 5.0 g/dL    Globulin 3.2 2.4 - 3.5 gm/dL    Albumin/Globulin Ratio 1.1 1.1 - 2.0  ratio    Bilirubin Total 0.7 <=1.5 mg/dL    Alkaline Phosphatase 105 40 - 150 unit/L    Alanine Aminotransferase 11 0 - 55 unit/L    Aspartate Aminotransferase 16 5 - 34 unit/L    eGFR >60 mls/min/1.73/m2   Protein/Creatinine Ratio, Urine    Collection Time: 24 11:12 AM   Result Value Ref Range    Urine Protein Level <6.8 mg/dL    Urine Creatinine 38.7 (L) 45.0 - 106.0 mg/dL   Type & Screen    Collection Time: 24 11:12 AM   Result Value Ref Range    Group & Rh O POS     Indirect Leonardo GEL NEG     Specimen Outdate 2024 23:59        Assessment:       43 y.o.  at 29w6d weeks gestation admitted for elevated blood pressures in the setting of known CHTN, here for medication adjustment.     There are no hospital problems to display for this patient.       Plan:     1. Elevated Bps (POA), chronic HTN  - Current regimen: Nifedipine 60mg XL BID, Hydralazine 25mg QID, metoprolol 25mg BID/ last adjusted:   - For acute HTN- use hydralazine protocol or PO nifedipine IR 10mg.   - Labs are unremarkable  - No proteinuria noted- start 24 hr urine (last 203mg)  - - she had severe range Bps yesterday in clinic and was admitted. She became severe again in the evening and was increased. She again had severe HTN this AM and magnesium was recommended. The patient declined, asking if she could try sleep aid due to suspected agitation effect on her BP. We have deferred magnesium but it is recommended if she has more severe Bps. Oral meds have been adjusted again.   - She reports side effects with use of labetalol in the past and requests us to try to not use that medication.   - Steroid course in progress    2. FWB  - No issues at this time  - NST BID    3. Restless legs  - Discussing oral magnesium supplement with pharmacy/  - SCDs  - FRANCESCA not suspected based on recent labs    4. Insomnia  - 2/2 steroid effect and RLS  -Benadryl did not help  - Phenergan ordered      Thank you for allowing us to participate  in the care of your patient. If you have any questions/concerns, please do not hesitate to contact us.     Jaimee Hwang  Maternal Fetal Medicine

## 2024-04-25 VITALS
DIASTOLIC BLOOD PRESSURE: 79 MMHG | HEIGHT: 64 IN | SYSTOLIC BLOOD PRESSURE: 134 MMHG | RESPIRATION RATE: 16 BRPM | HEART RATE: 80 BPM | WEIGHT: 150.13 LBS | OXYGEN SATURATION: 99 % | TEMPERATURE: 98 F | BODY MASS INDEX: 25.63 KG/M2

## 2024-04-25 LAB
ABORH RETYPE: NORMAL
PROT 24H UR-MCNC: NORMAL G/DL
PROT UR STRIP-MCNC: <6.8 MG/DL
TOTAL VOLUME  (OHS): 2600 ML

## 2024-04-25 PROCEDURE — 25000003 PHARM REV CODE 250: Performed by: OBSTETRICS & GYNECOLOGY

## 2024-04-25 PROCEDURE — G0378 HOSPITAL OBSERVATION PER HR: HCPCS

## 2024-04-25 PROCEDURE — 99233 SBSQ HOSP IP/OBS HIGH 50: CPT | Mod: ,,, | Performed by: OBSTETRICS & GYNECOLOGY

## 2024-04-25 PROCEDURE — 84156 ASSAY OF PROTEIN URINE: CPT | Performed by: OBSTETRICS & GYNECOLOGY

## 2024-04-25 PROCEDURE — 25000003 PHARM REV CODE 250: Performed by: STUDENT IN AN ORGANIZED HEALTH CARE EDUCATION/TRAINING PROGRAM

## 2024-04-25 PROCEDURE — 25000003 PHARM REV CODE 250: Performed by: NURSE PRACTITIONER

## 2024-04-25 RX ORDER — HYDRALAZINE HYDROCHLORIDE 25 MG/1
25 TABLET, FILM COATED ORAL 4 TIMES DAILY
COMMUNITY
End: 2024-05-02 | Stop reason: SDUPTHER

## 2024-04-25 RX ORDER — NIFEDIPINE 30 MG/1
90 TABLET, EXTENDED RELEASE ORAL 2 TIMES DAILY
COMMUNITY
End: 2024-05-02 | Stop reason: SDUPTHER

## 2024-04-25 RX ADMIN — CALCIUM CARBONATE (ANTACID) CHEW TAB 500 MG 500 MG: 500 CHEW TAB at 01:04

## 2024-04-25 RX ADMIN — HYDRALAZINE HYDROCHLORIDE 25 MG: 25 TABLET ORAL at 05:04

## 2024-04-25 RX ADMIN — NIFEDIPINE 90 MG: 60 TABLET, FILM COATED, EXTENDED RELEASE ORAL at 09:04

## 2024-04-25 RX ADMIN — METOPROLOL TARTRATE 25 MG: 25 TABLET, FILM COATED ORAL at 09:04

## 2024-04-25 RX ADMIN — CALCIUM CARBONATE (ANTACID) CHEW TAB 500 MG 500 MG: 500 CHEW TAB at 11:04

## 2024-04-25 RX ADMIN — ACETAMINOPHEN 650 MG: 325 TABLET, FILM COATED ORAL at 01:04

## 2024-04-25 RX ADMIN — HYDRALAZINE HYDROCHLORIDE 25 MG: 25 TABLET ORAL at 11:04

## 2024-04-25 RX ADMIN — HYDRALAZINE HYDROCHLORIDE 25 MG: 25 TABLET ORAL at 12:04

## 2024-04-25 NOTE — DISCHARGE INSTRUCTIONS
Please follow-up with your appts to Dr. Coreas's office and Dr. Hwang' office.   Please note in discharge instruction your medication dosage changes and time changes.   Please return to hospital if you experience the following: pain under your right breast, blurry/ changes in vision, headache that does not get better with medication, contractions that are 5-10 mins apart consistently for 1 hour that do not decrease in pain or duration with changing positions/rest or drinking increase amounts of water, vaginal leaking of fluid, vaginal bleeding like a period or passing any clots, and decreased fetal movement... etc. Went over maternal warning signs sheet with patient.

## 2024-04-25 NOTE — PROGRESS NOTES
Progress Note  Maternal Fetal Medicine          Subjective:         Sonja Hoff is a 43 y.o.  female with IUP at 30w0d with significant history of CHTN and PTD who is admitted for worsening Bps needing medication adjustment.    She slept better last night. Bps have been improved but still upper mild range. She is feeling well and has no complaints.     NST is currently not reactive, BPP ordered.    Objective:         Temp:  [98.1 °F (36.7 °C)-98.9 °F (37.2 °C)] 98.5 °F (36.9 °C)  Pulse:  [] 93  Resp:  [14-18] 16  SpO2:  [89 %-100 %] 100 %  BP: (109-177)/(58-95) 138/85    Gen: NAD, A&Ox3  Pulm: Unlabored breathing, LCTAB  Card: RRR  Abd: FHT present, soft, nondistended, nontender to palpation, gravid uterus palpable c/w gestational age  Extremities: Palpable peripheral pulses, no pedal edema,  DTRs x 4    NST: 130 baseline, moderate BTBV, no accelerations, neg decelerations  [place]: None  US:   : A detailed fetal anatomic ultrasound examination was performed for the following high risk indication: AMA.   The fetus is cephalic.  No fetal structural malformations are identified; however, fetal imaging is suboptimal for AA/DA.  Fetal size today is consistent with established gestational age with estimated weight at the 44th percentile and abdominal circumference at the 68th percentile.  Cervical length by TA scanning is normal.   Placental location is posterior without evidence of previa.   : BPP , repeat this afternoon.    Lab Review  Blood Type: O POS  Per OB    No results found for this or any previous visit (from the past 24 hour(s)).      Assessment:       43 y.o.  at 30w0d weeks gestation admitted for elevated blood pressures in the setting of known CHTN, here for medication adjustment.     There are no hospital problems to display for this patient.       Plan:     1. Elevated Bps (POA), chronic HTN  - Current regimen: Nifedipine 90mg XL BID, Hydralazine 25mg QID, metoprolol  25mg BID/ last adjusted: 4/25  - For acute HTN- use hydralazine protocol or PO nifedipine IR 10mg.   - Labs are unremarkable  - No proteinuria noted- start 24 hr urine (last 203mg)  - 4/24- she had severe range Bps yesterday in clinic and was admitted. She became severe again in the evening and was increased. She again had severe HTN this AM and magnesium was recommended. The patient declined, asking if she could try sleep aid due to suspected agitation effect on her BP. We have deferred magnesium but it is recommended if she has more severe Bps. Oral meds have been adjusted again.   - She reports side effects with use of labetalol in the past and requests us to try to not use that medication.   - s/p Steroid course 4/23-24  - 4/25: BP control improving, still feeling good. 24 hr urine will be done today. BPP pending. Will consider DC later today if possible.    2. FWB  - No issues at this time  - NST BID    3. Restless legs  - SCDs helping    4. Insomnia  - 2/2 steroid effect and RLS  -Benadryl did not help  - Phenergan ordered PRN      Thank you for allowing us to participate in the care of your patient. If you have any questions/concerns, please do not hesitate to contact us.     Jaimee Hwang  Maternal Fetal Medicine

## 2024-04-26 ENCOUNTER — PATIENT MESSAGE (OUTPATIENT)
Dept: MATERNAL FETAL MEDICINE | Facility: CLINIC | Age: 44
End: 2024-04-26
Payer: MEDICAID

## 2024-04-30 ENCOUNTER — HOSPITAL ENCOUNTER (OUTPATIENT)
Facility: HOSPITAL | Age: 44
Discharge: HOME | End: 2024-05-01
Attending: SPECIALIST | Admitting: OBSTETRICS & GYNECOLOGY
Payer: MEDICAID

## 2024-04-30 DIAGNOSIS — O13.3 GESTATIONAL HYPERTENSION, THIRD TRIMESTER: ICD-10-CM

## 2024-04-30 DIAGNOSIS — O10.919 CHRONIC HYPERTENSION AFFECTING PREGNANCY: Primary | ICD-10-CM

## 2024-04-30 LAB
ALBUMIN SERPL-MCNC: 3.4 G/DL (ref 3.5–5)
ALBUMIN/GLOB SERPL: 1.1 RATIO (ref 1.1–2)
ALP SERPL-CCNC: 110 UNIT/L (ref 40–150)
ALT SERPL-CCNC: 22 UNIT/L (ref 0–55)
AST SERPL-CCNC: 21 UNIT/L (ref 5–34)
BASOPHILS # BLD AUTO: 0.05 X10(3)/MCL
BASOPHILS NFR BLD AUTO: 0.4 %
BILIRUB SERPL-MCNC: 0.5 MG/DL
BUN SERPL-MCNC: 9.9 MG/DL (ref 7–18.7)
CALCIUM SERPL-MCNC: 8.9 MG/DL (ref 8.4–10.2)
CHLORIDE SERPL-SCNC: 107 MMOL/L (ref 98–107)
CO2 SERPL-SCNC: 20 MMOL/L (ref 22–29)
CREAT SERPL-MCNC: 0.53 MG/DL (ref 0.55–1.02)
EOSINOPHIL # BLD AUTO: 0.15 X10(3)/MCL (ref 0–0.9)
EOSINOPHIL NFR BLD AUTO: 1.1 %
ERYTHROCYTE [DISTWIDTH] IN BLOOD BY AUTOMATED COUNT: 13.2 % (ref 11.5–17)
GFR SERPLBLD CREATININE-BSD FMLA CKD-EPI: >60 MLS/MIN/1.73/M2
GLOBULIN SER-MCNC: 3.1 GM/DL (ref 2.4–3.5)
GLUCOSE SERPL-MCNC: 99 MG/DL (ref 74–100)
HCT VFR BLD AUTO: 38.8 % (ref 37–47)
HGB BLD-MCNC: 13.9 G/DL (ref 12–16)
IMM GRANULOCYTES # BLD AUTO: 0.2 X10(3)/MCL (ref 0–0.04)
IMM GRANULOCYTES NFR BLD AUTO: 1.5 %
LDH SERPL-CCNC: 222 U/L (ref 125–220)
LYMPHOCYTES # BLD AUTO: 1.67 X10(3)/MCL (ref 0.6–4.6)
LYMPHOCYTES NFR BLD AUTO: 12.5 %
MCH RBC QN AUTO: 32.2 PG (ref 27–31)
MCHC RBC AUTO-ENTMCNC: 35.8 G/DL (ref 33–36)
MCV RBC AUTO: 89.8 FL (ref 80–94)
MONOCYTES # BLD AUTO: 1.14 X10(3)/MCL (ref 0.1–1.3)
MONOCYTES NFR BLD AUTO: 8.5 %
NEUTROPHILS # BLD AUTO: 10.17 X10(3)/MCL (ref 2.1–9.2)
NEUTROPHILS NFR BLD AUTO: 76 %
NRBC BLD AUTO-RTO: 0 %
PLATELET # BLD AUTO: 275 X10(3)/MCL (ref 130–400)
PMV BLD AUTO: 9.6 FL (ref 7.4–10.4)
POTASSIUM SERPL-SCNC: 4.1 MMOL/L (ref 3.5–5.1)
PROT SERPL-MCNC: 6.5 GM/DL (ref 6.4–8.3)
RBC # BLD AUTO: 4.32 X10(6)/MCL (ref 4.2–5.4)
SODIUM SERPL-SCNC: 137 MMOL/L (ref 136–145)
URATE SERPL-MCNC: 3.6 MG/DL (ref 2.6–6)
WBC # SPEC AUTO: 13.38 X10(3)/MCL (ref 4.5–11.5)

## 2024-04-30 PROCEDURE — 99285 EMERGENCY DEPT VISIT HI MDM: CPT | Mod: 25

## 2024-04-30 PROCEDURE — G0378 HOSPITAL OBSERVATION PER HR: HCPCS

## 2024-04-30 PROCEDURE — 85025 COMPLETE CBC W/AUTO DIFF WBC: CPT | Performed by: SPECIALIST

## 2024-04-30 PROCEDURE — 96374 THER/PROPH/DIAG INJ IV PUSH: CPT

## 2024-04-30 PROCEDURE — 80053 COMPREHEN METABOLIC PANEL: CPT | Performed by: SPECIALIST

## 2024-04-30 PROCEDURE — 25000003 PHARM REV CODE 250: Performed by: OBSTETRICS & GYNECOLOGY

## 2024-04-30 PROCEDURE — 63600175 PHARM REV CODE 636 W HCPCS: Performed by: SPECIALIST

## 2024-04-30 PROCEDURE — 83615 LACTATE (LD) (LDH) ENZYME: CPT | Performed by: SPECIALIST

## 2024-04-30 PROCEDURE — 84550 ASSAY OF BLOOD/URIC ACID: CPT | Performed by: SPECIALIST

## 2024-04-30 RX ORDER — HYDRALAZINE HYDROCHLORIDE 20 MG/ML
10 INJECTION INTRAMUSCULAR; INTRAVENOUS ONCE AS NEEDED
Status: DISCONTINUED | OUTPATIENT
Start: 2024-04-30 | End: 2024-05-01 | Stop reason: HOSPADM

## 2024-04-30 RX ORDER — HYDRALAZINE HYDROCHLORIDE 50 MG/1
50 TABLET, FILM COATED ORAL EVERY 6 HOURS
Status: DISCONTINUED | OUTPATIENT
Start: 2024-04-30 | End: 2024-05-01 | Stop reason: HOSPADM

## 2024-04-30 RX ORDER — LABETALOL 200 MG/1
200 TABLET, FILM COATED ORAL EVERY 8 HOURS
Status: DISCONTINUED | OUTPATIENT
Start: 2024-04-30 | End: 2024-05-01 | Stop reason: HOSPADM

## 2024-04-30 RX ORDER — LABETALOL HYDROCHLORIDE 5 MG/ML
80 INJECTION, SOLUTION INTRAVENOUS ONCE AS NEEDED
Status: DISCONTINUED | OUTPATIENT
Start: 2024-04-30 | End: 2024-05-01 | Stop reason: HOSPADM

## 2024-04-30 RX ORDER — NAPROXEN SODIUM 220 MG/1
81 TABLET, FILM COATED ORAL DAILY
Status: DISCONTINUED | OUTPATIENT
Start: 2024-05-01 | End: 2024-05-01 | Stop reason: HOSPADM

## 2024-04-30 RX ORDER — HYDRALAZINE HYDROCHLORIDE 25 MG/1
25 TABLET, FILM COATED ORAL ONCE
Status: COMPLETED | OUTPATIENT
Start: 2024-04-30 | End: 2024-04-30

## 2024-04-30 RX ORDER — PRENATAL WITH FERROUS FUM AND FOLIC ACID 3080; 920; 120; 400; 22; 1.84; 3; 20; 10; 1; 12; 200; 27; 25; 2 [IU]/1; [IU]/1; MG/1; [IU]/1; MG/1; MG/1; MG/1; MG/1; MG/1; MG/1; UG/1; MG/1; MG/1; MG/1; MG/1
1 TABLET ORAL DAILY
Status: DISCONTINUED | OUTPATIENT
Start: 2024-05-01 | End: 2024-05-01 | Stop reason: HOSPADM

## 2024-04-30 RX ORDER — LABETALOL HYDROCHLORIDE 5 MG/ML
20 INJECTION, SOLUTION INTRAVENOUS ONCE AS NEEDED
Status: COMPLETED | OUTPATIENT
Start: 2024-04-30 | End: 2024-04-30

## 2024-04-30 RX ORDER — LABETALOL HYDROCHLORIDE 5 MG/ML
40 INJECTION, SOLUTION INTRAVENOUS ONCE AS NEEDED
Status: DISCONTINUED | OUTPATIENT
Start: 2024-04-30 | End: 2024-05-01 | Stop reason: HOSPADM

## 2024-04-30 RX ORDER — METOPROLOL TARTRATE 25 MG/1
25 TABLET, FILM COATED ORAL 2 TIMES DAILY
Status: DISCONTINUED | OUTPATIENT
Start: 2024-04-30 | End: 2024-04-30

## 2024-04-30 RX ORDER — SERTRALINE HYDROCHLORIDE 25 MG/1
50 TABLET, FILM COATED ORAL DAILY
Status: DISCONTINUED | OUTPATIENT
Start: 2024-05-01 | End: 2024-05-01 | Stop reason: HOSPADM

## 2024-04-30 RX ADMIN — HYDRALAZINE HYDROCHLORIDE 50 MG: 50 TABLET ORAL at 09:04

## 2024-04-30 RX ADMIN — LABETALOL HYDROCHLORIDE 20 MG: 5 INJECTION, SOLUTION INTRAVENOUS at 03:04

## 2024-04-30 RX ADMIN — LABETALOL HYDROCHLORIDE 200 MG: 200 TABLET, FILM COATED ORAL at 08:04

## 2024-04-30 RX ADMIN — NIFEDIPINE 90 MG: 60 TABLET, FILM COATED, EXTENDED RELEASE ORAL at 09:04

## 2024-04-30 RX ADMIN — HYDRALAZINE HYDROCHLORIDE 25 MG: 25 TABLET ORAL at 05:04

## 2024-04-30 NOTE — ED TRIAGE NOTES
Patient is a 44 y/o female present in [place] for evaluation of BP. Patient has hx of hypertension. Patient currently denies HA, blurred vision, dizziness, and epigastric pain. Patient denies VB and LOF and states +FM.

## 2024-04-30 NOTE — ED PROVIDER NOTES
"       [place] NOTE     Admit Date: 2024  [place] Physician: Eric Jarquin  Primary OBGYN: Dr. Coreas    Chief Complaint   Patient presents with    Hypertension       Hospital Course:  Sonja Hoff is a 43 y.o.  at 30w5d was sent to the OB ED from her physician's office because of an elevated blood pressure reading there and they request to do additional evaluation.  Patient reports her blood pressure in the office was over 160 systolic and 100 diastolic.  She denies any symptoms such as headache, visual disturbances, abdominal pain or altered mental status.    This IUP is complicated by chronic hypertension with superimposed gestational hypertension..    Patient denies vaginal bleeding, leakage of fluid, contractions, headache, vision changes, RUQ pain, dysuria, fever, and nausea/vomiting.  Fetal Movement: normal.      BP (!) 174/107   Pulse 98   Ht 5' 4" (1.626 m)   Wt 68 kg (150 lb)   LMP 2023 (Exact Date)   BMI 25.75 kg/m²   Pulse:  [71] 71  BP: (154)/(107) 154/107    General: in no apparent distress  Cardiovascular: regular rate and rhythm no murmurs  Respiratory: clear to auscultation, no wheezes, rales or rhonchi, symmetric air entry  Abdominal: FHT present  Back: lumbar tenderness present CVA tenderness none  Extremeties no redness or tenderness in the calves or thighs    SSE:   SVE:  Deferred      FHT: Category 1  TOCO:  No contractions are noted    Medical Decision Making:  An IV access will be acquired and preeclampsia labs obtained.  Sequential blood pressures and continuous fetal monitoring.  Initiation of the labetalol extreme hypertension protocol.    LABS:     Recent Results (from the past 24 hour(s))   Comprehensive metabolic panel    Collection Time: 24  2:37 PM   Result Value Ref Range    Sodium Level 137 136 - 145 mmol/L    Potassium Level 4.1 3.5 - 5.1 mmol/L    Chloride 107 98 - 107 mmol/L    Carbon Dioxide 20 (L) 22 - 29 mmol/L    Glucose Level 99 74 - 100 mg/dL    " Blood Urea Nitrogen 9.9 7.0 - 18.7 mg/dL    Creatinine 0.53 (L) 0.55 - 1.02 mg/dL    Calcium Level Total 8.9 8.4 - 10.2 mg/dL    Protein Total 6.5 6.4 - 8.3 gm/dL    Albumin Level 3.4 (L) 3.5 - 5.0 g/dL    Globulin 3.1 2.4 - 3.5 gm/dL    Albumin/Globulin Ratio 1.1 1.1 - 2.0 ratio    Bilirubin Total 0.5 <=1.5 mg/dL    Alkaline Phosphatase 110 40 - 150 unit/L    Alanine Aminotransferase 22 0 - 55 unit/L    Aspartate Aminotransferase 21 5 - 34 unit/L    eGFR >60 mls/min/1.73/m2   Uric acid    Collection Time: 24  2:37 PM   Result Value Ref Range    Uric Acid 3.6 2.6 - 6.0 mg/dL   Lactate Dehydrogenase    Collection Time: 24  2:37 PM   Result Value Ref Range    Lactate Dehydrogenase 222 (H) 125 - 220 U/L   CBC with Differential    Collection Time: 24  2:37 PM   Result Value Ref Range    WBC 13.38 (H) 4.50 - 11.50 x10(3)/mcL    RBC 4.32 4.20 - 5.40 x10(6)/mcL    Hgb 13.9 12.0 - 16.0 g/dL    Hct 38.8 37.0 - 47.0 %    MCV 89.8 80.0 - 94.0 fL    MCH 32.2 (H) 27.0 - 31.0 pg    MCHC 35.8 33.0 - 36.0 g/dL    RDW 13.2 11.5 - 17.0 %    Platelet 275 130 - 400 x10(3)/mcL    MPV 9.6 7.4 - 10.4 fL    Neut % 76.0 %    Lymph % 12.5 %    Mono % 8.5 %    Eos % 1.1 %    Basophil % 0.4 %    Lymph # 1.67 0.6 - 4.6 x10(3)/mcL    Neut # 10.17 (H) 2.1 - 9.2 x10(3)/mcL    Mono # 1.14 0.1 - 1.3 x10(3)/mcL    Eos # 0.15 0 - 0.9 x10(3)/mcL    Baso # 0.05 <=0.2 x10(3)/mcL    IG# 0.20 (H) 0 - 0.04 x10(3)/mcL    IG% 1.5 %    NRBC% 0.0 %     [unfilled]     Imaging Results    None          ASSESMENT: Sonja Hoff is a 43 y.o.   at 30w5d with chronic hypertension with superimposed gestational hypertension.  Discussed patient with her primary OB who will place her on observation.  Requested Maternal-Fetal medicine consultation.  Biophysical profile requested.    Discharge Diagnosis/Clinical Impression:  Pregnancy 30 weeks.  Chronic hypertension.  Superimposed gestational  hypertension.    Status:Stable    Disposition:  admitted to primary OB service    Medications:       Patient Instructions:   Current Discharge Medication List        CONTINUE these medications which have NOT CHANGED    Details   aspirin (ECOTRIN) 81 MG EC tablet Take 81 mg by mouth once daily.      hydrALAZINE (APRESOLINE) 25 MG tablet Take 25 mg by mouth 4 (four) times daily.      metoprolol tartrate (LOPRESSOR) 25 MG tablet Take 25 mg by mouth 2 (two) times daily.      NIFEdipine (PROCARDIA-XL) 30 MG (OSM) 24 hr tablet Take 90 mg by mouth 2 (two) times a day.      PNV,calcium 72/iron/folic acid (PRENATAL VITAMIN) Tab Take 1 tablet by mouth once daily.  Qty: 30 tablet, Refills: 11      sertraline (ZOLOFT) 50 MG tablet Take 50 mg by mouth once daily.      calcium-vitamin D 250 mg-2.5 mcg (100 unit) per tablet Take 1 tablet by mouth 2 (two) times daily.      methyldopa (ALDOMET) 500 MG tablet Take 500 mg by mouth 2 (two) times a day.      prenatal no122/iron/folic acid (PRENATAL MULTI ORAL) Take by mouth.               She will follow up with her primary OB.    No discharge procedures on file.    Eric Jarquin MD  OB-GYN Hospitalist       Eric Jarquin MD  04/30/24 [id number]

## 2024-05-01 VITALS
OXYGEN SATURATION: 99 % | HEART RATE: 70 BPM | DIASTOLIC BLOOD PRESSURE: 83 MMHG | TEMPERATURE: 98 F | WEIGHT: 149 LBS | RESPIRATION RATE: 16 BRPM | BODY MASS INDEX: 25.44 KG/M2 | SYSTOLIC BLOOD PRESSURE: 143 MMHG | HEIGHT: 64 IN

## 2024-05-01 LAB
CREAT UR-MCNC: 29 MG/DL (ref 45–106)
PROT UR STRIP-MCNC: <6.8 MG/DL

## 2024-05-01 PROCEDURE — 82570 ASSAY OF URINE CREATININE: CPT | Performed by: OBSTETRICS & GYNECOLOGY

## 2024-05-01 PROCEDURE — G0378 HOSPITAL OBSERVATION PER HR: HCPCS

## 2024-05-01 PROCEDURE — 99223 1ST HOSP IP/OBS HIGH 75: CPT | Mod: ,,, | Performed by: OBSTETRICS & GYNECOLOGY

## 2024-05-01 PROCEDURE — 25000003 PHARM REV CODE 250: Performed by: OBSTETRICS & GYNECOLOGY

## 2024-05-01 PROCEDURE — 84156 ASSAY OF PROTEIN URINE: CPT | Performed by: OBSTETRICS & GYNECOLOGY

## 2024-05-01 RX ORDER — LABETALOL 200 MG/1
200 TABLET, FILM COATED ORAL EVERY 8 HOURS
Qty: 90 TABLET | Refills: 3 | Status: ON HOLD | OUTPATIENT
Start: 2024-05-01 | End: 2024-06-10 | Stop reason: HOSPADM

## 2024-05-01 RX ADMIN — SERTRALINE HYDROCHLORIDE 50 MG: 25 TABLET ORAL at 08:05

## 2024-05-01 RX ADMIN — ASPIRIN 81 MG CHEWABLE TABLET 81 MG: 81 TABLET CHEWABLE at 08:05

## 2024-05-01 RX ADMIN — NIFEDIPINE 90 MG: 60 TABLET, FILM COATED, EXTENDED RELEASE ORAL at 08:05

## 2024-05-01 RX ADMIN — LABETALOL HYDROCHLORIDE 200 MG: 200 TABLET, FILM COATED ORAL at 05:05

## 2024-05-01 RX ADMIN — HYDRALAZINE HYDROCHLORIDE 50 MG: 50 TABLET ORAL at 02:05

## 2024-05-01 RX ADMIN — PRENATAL VITAMINS-IRON FUMARATE 27 MG IRON-FOLIC ACID 0.8 MG TABLET 1 TABLET: at 08:05

## 2024-05-01 RX ADMIN — LABETALOL HYDROCHLORIDE 200 MG: 200 TABLET, FILM COATED ORAL at 12:05

## 2024-05-01 RX ADMIN — HYDRALAZINE HYDROCHLORIDE 50 MG: 50 TABLET ORAL at 03:05

## 2024-05-01 RX ADMIN — HYDRALAZINE HYDROCHLORIDE 50 MG: 50 TABLET ORAL at 08:05

## 2024-05-01 NOTE — DISCHARGE INSTRUCTIONS
Pt instructed to follow up with Dr. Coreas and Dr. Hwang for next scheduled appoinment. Continue medications as prescribed including new doses of medications    Labetalol 200mg q8hr  Procardia 90mg twice a day  Hydralazine 50mg four times a day.     If any leaking of fluid, vaginal bleeding, or decreased fetal movement return to the hospital for evaluation. If contractions are 5 minutes apart lasting longer than 2 hours return to the hospital for evaluation. If constant headache unrelieved with tylenol or blurry vision, epigastric pain, or consistent high blood pressure greater than 160/90, please consult your physician or return to the hospital.

## 2024-05-01 NOTE — CONSULTS
Consultation Note  Maternal Fetal Medicine          Subjective:         Sonja Hoff is a 43 y.o.  female with IUP at 30w6d with significant history of CHTN and PTD who is admitted for severe range Bps.     Sonja was sent from Dr. Coreas's clinic yesterday due to severe range Bps despite multiple medications and NR NST. She also reports a slight HA yesterday.   When she arrived she was intermittently severe and I switched her from metoprolol to labetalol 200mg TID. I also increased her hydralazine to 50mg QID.     Overnight her Bps are looking better and she is feeling well. She has been mostly normotensive since these adjustments. Labs unremarkable. She had steroids and magnesium at her last admission.      PMHx:   Past Medical History:   Diagnosis Date    Anxiety and depression     Hypertension     Mitral valve prolapse        PSHx:   Past Surgical History:   Procedure Laterality Date    BUNIONECTOMY      TONSILLECTOMY AND ADENOIDECTOMY      WISDOM TOOTH EXTRACTION         All: Review of patient's allergies indicates:  No Known Allergies    Meds:   Medications Prior to Admission   Medication Sig Dispense Refill Last Dose    aspirin (ECOTRIN) 81 MG EC tablet Take 81 mg by mouth once daily.       hydrALAZINE (APRESOLINE) 25 MG tablet Take 25 mg by mouth 4 (four) times daily.       metoprolol tartrate (LOPRESSOR) 25 MG tablet Take 25 mg by mouth 2 (two) times daily.       NIFEdipine (PROCARDIA-XL) 30 MG (OSM) 24 hr tablet Take 90 mg by mouth 2 (two) times a day.       PNV,calcium 72/iron/folic acid (PRENATAL VITAMIN) Tab Take 1 tablet by mouth once daily. 30 tablet 11     sertraline (ZOLOFT) 50 MG tablet Take 50 mg by mouth once daily.       calcium-vitamin D 250 mg-2.5 mcg (100 unit) per tablet Take 1 tablet by mouth 2 (two) times daily.       methyldopa (ALDOMET) 500 MG tablet Take 500 mg by mouth 2 (two) times a day.       prenatal no122/iron/folic acid (PRENATAL MULTI ORAL) Take by mouth.          SH:    Social History     Socioeconomic History    Marital status:      Spouse name: Dale Hoff   Tobacco Use    Smoking status: Never   Substance and Sexual Activity    Alcohol use: Yes     Comment: red wine once a month    Drug use: Never    Sexual activity: Yes     Partners: Male     Social Determinants of Health     Financial Resource Strain: Low Risk  (2020)    Received from [facility] Subsidiaries and Affiliates    Overall Financial Resource Strain (CARDIA)     Difficulty of Paying Living Expenses: Not hard at all   Food Insecurity: No Food Insecurity (2020)    Received from [facility] Subsidiaries and Affiliates    Hunger Vital Sign     Worried About Running Out of Food in the Last Year: Never true     Ran Out of Food in the Last Year: Never true   Transportation Needs: No Transportation Needs (2020)    Received from [facility] Subsidi[facility] and Affiliates    PRAPARE - Transportation     Lack of Transportation (Medical): No     Lack of Transportation (Non-Medical): No   Physical Activity: Unknown (2020)    [facility]    Exercise Vital Sign     Days of Exercise per Week: Patient declined     Minutes of Exercise per Session: Patient declined   Stress: No Stress Concern Present (2020)    Received from [facility]sidi[facility] and Affiliates    [employer] [place] [employer] - Occupational Stress Questionnaire     Feeling of Stress : Not at all       FH:   Family History   Problem Relation Name Age of Onset    Diabetes Father      Hypertension Mother      Hypertension Brother         OBHx:   OB History    Para Term  AB Living   13 7 4 3 5 7   SAB IAB Ectopic Multiple Live Births   5 0  0 0 7      # Outcome Date GA Lbr Mark/2nd Weight Sex Type Anes PTL Lv   13 Current            12 SAB 2022 6w0d       FD   11 SAB 2022 6w0d       FD   10  20 35w0d  2.495 kg (5 lb 8 oz) F Vag-Spont   CRYS      Name: Priyanka   9 SAB 2019 11w0d       FD   8  17 36w0d  2.637 kg (5 lb 13 oz) M Vag-Spont   CRYS      Name: Edwin   7 SAB 2016 6w0d       FD   6 SAB 10/2016 6w0d       FD   5  14 36w0d  2.58 kg (5 lb 11 oz) F Vag-Spont   CRYS      Name: Ramona   4 Term 12 37w0d  2.381 kg (5 lb 4 oz) F Vag-Spont   CRYS      Name: Barbie   3 Term 11 38w0d  3.175 kg (7 lb) F Vag-Spont   RCYS      Name: Sommer   2 Term 09 39w0d  2.722 kg (6 lb) F Vag-Spont EPI  CRYS      Name: Preeti   1 Term 08 39w0d  3.629 kg (8 lb) M Vag-Spont   CRYS      Name: Yamil       Objective:         Temp:  [98.2 °F (36.8 °C)] 98.2 °F (36.8 °C)  Pulse:  [] 83  Resp:  [16] 16  SpO2:  [96 %-99 %] 99 %  BP: (128-174)/() 137/89    Gen: NAD, A&Ox3  Pulm: Unlabored breathing, LCTAB  Card: RRR  Abd: FHT present, soft, nondistended, nontender to palpation, gravid uterus palpable c/w gestational age  Extremities: Palpable peripheral pulses, no pedal edema,  DTRs x 4    NST: 130 baseline, moderate BTBV, pos accelerations, neg decelerations  [place]: Quiet   US:   : A detailed fetal anatomic ultrasound examination was performed for the following high risk indication: AMA.   The fetus is cephalic.  No fetal structural malformations are identified; however, fetal imaging is suboptimal for AA/DA.  Fetal size today is consistent with established gestational age with estimated weight at the 44th percentile and abdominal circumference at the 68th percentile.  Cervical length by TA scanning is normal.   Placental location is posterior without evidence of previa.   : BPP , distended bladder and possible megaureter, reevaluate at [facility] office tomorrow (previously normal on )    Lab  Review  Blood Type: O POS  Per OB    Recent Results (from the past 24 hour(s))   Comprehensive metabolic panel    Collection Time: 24  2:37 PM   Result Value Ref Range    Sodium Level 137 136 - 145 mmol/L    Potassium Level 4.1 3.5 - 5.1 mmol/L    Chloride 107 98 - 107 mmol/L    Carbon Dioxide 20 (L) 22 - 29 mmol/L    Glucose Level 99 74 - 100 mg/dL    Blood Urea Nitrogen 9.9 7.0 - 18.7 mg/dL    Creatinine 0.53 (L) 0.55 - 1.02 mg/dL    Calcium Level Total 8.9 8.4 - 10.2 mg/dL    Protein Total 6.5 6.4 - 8.3 gm/dL    Albumin Level 3.4 (L) 3.5 - 5.0 g/dL    Globulin 3.1 2.4 - 3.5 gm/dL    Albumin/Globulin Ratio 1.1 1.1 - 2.0 ratio    Bilirubin Total 0.5 <=1.5 mg/dL    Alkaline Phosphatase 110 40 - 150 unit/L    Alanine Aminotransferase 22 0 - 55 unit/L    Aspartate Aminotransferase 21 5 - 34 unit/L    eGFR >60 mls/min/1.73/m2   Uric acid    Collection Time: 24  2:37 PM   Result Value Ref Range    Uric Acid 3.6 2.6 - 6.0 mg/dL   Lactate Dehydrogenase    Collection Time: 24  2:37 PM   Result Value Ref Range    Lactate Dehydrogenase 222 (H) 125 - 220 U/L   CBC with Differential    Collection Time: 24  2:37 PM   Result Value Ref Range    WBC 13.38 (H) 4.50 - 11.50 x10(3)/mcL    RBC 4.32 4.20 - 5.40 x10(6)/mcL    Hgb 13.9 12.0 - 16.0 g/dL    Hct 38.8 37.0 - 47.0 %    MCV 89.8 80.0 - 94.0 fL    MCH 32.2 (H) 27.0 - 31.0 pg    MCHC 35.8 33.0 - 36.0 g/dL    RDW 13.2 11.5 - 17.0 %    Platelet 275 130 - 400 x10(3)/mcL    MPV 9.6 7.4 - 10.4 fL    Neut % 76.0 %    Lymph % 12.5 %    Mono % 8.5 %    Eos % 1.1 %    Basophil % 0.4 %    Lymph # 1.67 0.6 - 4.6 x10(3)/mcL    Neut # 10.17 (H) 2.1 - 9.2 x10(3)/mcL    Mono # 1.14 0.1 - 1.3 x10(3)/mcL    Eos # 0.15 0 - 0.9 x10(3)/mcL    Baso # 0.05 <=0.2 x10(3)/mcL    IG# 0.20 (H) 0 - 0.04 x10(3)/mcL    IG% 1.5 %    NRBC% 0.0 %       Assessment:       43 y.o.  at 30w6d weeks gestation admitted for elevated blood pressures in the setting of known CHTN, here for  NRNST and elevated Bps.     There are no hospital problems to display for this patient.       Plan:     1. Elevated Bps (POA), chronic HTN  - Current regimen: Nifedipine 90mg XL BID, Hydralazine 50mg QID, labetalol 200mg TID/ last adjusted: 5/1  - For acute HTN- use labetalol protocol  - Labs are unremarkable  - No proteinuria noted on multiple checks (last 4/24).   - S/p steroids 4/24, s/p neuromagnesium  - Bps profile is much better since titration. Labs unremarkable. Up/c pending. Likely DC today if remains stable    2. FWB  - NST reactive, BPP 8/8  - NST BID  - S/p steroids  - Bladder may be slightly enlarged. Will re-image tomorrow in office if she can be discharged.    Monitor through the day and OK for discharge if Bps still controlled. Ensure she is on the above regimen at discharge.    Thank you for allowing us to participate in the care of your patient. If you have any questions/concerns, please do not hesitate to contact us.     Jaimee Hwang  Maternal Fetal Medicine

## 2024-05-02 ENCOUNTER — PROCEDURE VISIT (OUTPATIENT)
Dept: MATERNAL FETAL MEDICINE | Facility: CLINIC | Age: 44
End: 2024-05-02
Payer: MEDICAID

## 2024-05-02 ENCOUNTER — OFFICE VISIT (OUTPATIENT)
Dept: MATERNAL FETAL MEDICINE | Facility: CLINIC | Age: 44
End: 2024-05-02
Payer: MEDICAID

## 2024-05-02 VITALS
BODY MASS INDEX: 25.9 KG/M2 | DIASTOLIC BLOOD PRESSURE: 90 MMHG | SYSTOLIC BLOOD PRESSURE: 148 MMHG | HEART RATE: 85 BPM | HEIGHT: 64 IN | WEIGHT: 151.69 LBS

## 2024-05-02 DIAGNOSIS — O09.523 MULTIGRAVIDA OF ADVANCED MATERNAL AGE IN THIRD TRIMESTER: ICD-10-CM

## 2024-05-02 DIAGNOSIS — Z87.59 HISTORY OF PRIOR PREGNANCY WITH IUGR NEWBORN: ICD-10-CM

## 2024-05-02 DIAGNOSIS — O10.919 CHRONIC HYPERTENSION AFFECTING PREGNANCY: Primary | ICD-10-CM

## 2024-05-02 DIAGNOSIS — O26.23 RECURRENT PREGNANCY LOSS IN PREGNANT PATIENT IN THIRD TRIMESTER, ANTEPARTUM: ICD-10-CM

## 2024-05-02 DIAGNOSIS — O16.3 HYPERTENSION AFFECTING PREGNANCY IN THIRD TRIMESTER: ICD-10-CM

## 2024-05-02 PROCEDURE — 3008F BODY MASS INDEX DOCD: CPT | Mod: CPTII,S$GLB,, | Performed by: OBSTETRICS & GYNECOLOGY

## 2024-05-02 PROCEDURE — 99214 OFFICE O/P EST MOD 30 MIN: CPT | Mod: TH,S$GLB,, | Performed by: OBSTETRICS & GYNECOLOGY

## 2024-05-02 PROCEDURE — 3077F SYST BP >= 140 MM HG: CPT | Mod: CPTII,S$GLB,, | Performed by: OBSTETRICS & GYNECOLOGY

## 2024-05-02 PROCEDURE — 1159F MED LIST DOCD IN RCRD: CPT | Mod: CPTII,S$GLB,, | Performed by: OBSTETRICS & GYNECOLOGY

## 2024-05-02 PROCEDURE — 76819 FETAL BIOPHYS PROFIL W/O NST: CPT | Mod: S$GLB,,, | Performed by: OBSTETRICS & GYNECOLOGY

## 2024-05-02 PROCEDURE — 3080F DIAST BP >= 90 MM HG: CPT | Mod: CPTII,S$GLB,, | Performed by: OBSTETRICS & GYNECOLOGY

## 2024-05-02 RX ORDER — NIFEDIPINE 90 MG/1
90 TABLET, EXTENDED RELEASE ORAL 2 TIMES DAILY
Qty: 60 TABLET | Refills: 1 | Status: ON HOLD | OUTPATIENT
Start: 2024-05-02 | End: 2024-06-10 | Stop reason: HOSPADM

## 2024-05-02 RX ORDER — HYDRALAZINE HYDROCHLORIDE 50 MG/1
50 TABLET, FILM COATED ORAL 4 TIMES DAILY
Qty: 120 TABLET | Refills: 1 | Status: ON HOLD | OUTPATIENT
Start: 2024-05-02 | End: 2024-06-10 | Stop reason: HOSPADM

## 2024-05-02 NOTE — DISCHARGE SUMMARY
[facility] - Antepartum  Discharge Summary  OBGYN    Admission date: 4/23/2024  Discharge date: 4/23/2024    Admit Dx:    Chronic hypertension during pregnancy [O10.919]  Discharge Dx:  Chronic hypertension during pregnancy [O10.919]    Attending Physician: Casey Coreas   Discharge Provider: Casey Coreas    Procedures Performed: * No surgery found *    Hospital Course: Patient was admitted on 4/23/2024 .  Barring any unforseen incidents, patient will be discharged home when she is able to ambulate, void, and tolerate PO intake.    Consults: none      Discharged Condition: stable    Disposition: Home    Follow Up:   1 weeks in office.

## 2024-05-02 NOTE — PROGRESS NOTES
"MATERNAL-FETAL MEDICINE PROGRESS NOTE    SUBJECTIVE:     Ms. Sonja Hoff is a 43 y.o.  female with IUP at 31w1d who is seen in consultation by MFM for evaluation and management of:  Problem   - Multigravida of advanced maternal age in third trimester   - Chronic hypertension affecting pregnancy   - Recurrent pregnancy loss in pregnant patient in third trimester, antepartum   - History of prior pregnancy with IUGR         Sonja has been admitted twice since last office visit.   Current regimen: labetalol 200mg TID, nifedipine 90 XL BID, Hydralazine 50mg QID  She is feeling well and has no complaints. No s/s of preeclampsia present.         Medication List with Changes/Refills   Current Medications    ASPIRIN (ECOTRIN) 81 MG EC TABLET    Take 81 mg by mouth once daily.    HYDRALAZINE (APRESOLINE) 50 MG TABLET    Take 1 tablet (50 mg total) by mouth 4 (four) times daily.    LABETALOL (NORMODYNE) 200 MG TABLET    Take 1 tablet (200 mg total) by mouth every 8 (eight) hours.    NIFEDIPINE (PROCARDIA-XL) 90 MG (OSM) 24 HR TABLET    Take 1 tablet (90 mg total) by mouth 2 (two) times a day.    PNV,CALCIUM 72/IRON/FOLIC ACID (PRENATAL VITAMIN) TAB    Take 1 tablet by mouth once daily.    SERTRALINE (ZOLOFT) 50 MG TABLET    Take 50 mg by mouth once daily.   Discontinued Medications    CALCIUM-VITAMIN D 250 MG-2.5 MCG (100 UNIT) PER TABLET    Take 1 tablet by mouth 2 (two) times daily.    METHYLDOPA (ALDOMET) 500 MG TABLET    Take 500 mg by mouth 2 (two) times a day.    METOPROLOL TARTRATE (LOPRESSOR) 25 MG TABLET    Take 25 mg by mouth 2 (two) times daily.    PRENATAL /IRON/FOLIC ACID (PRENATAL MULTI ORAL)    Take by mouth.         Objective:   BP (!) 148/90 (BP Location: Right arm)   Pulse 85   Ht 5' 4" (1.626 m)   Wt 68.8 kg (151 lb 10.8 oz)   LMP 2023 (Exact Date)   BMI 26.04 kg/m²     Ultrasound performed. See viewpoint for full ultrasound report.    A viable pruett pregnancy is seen in " cephalic presentation.   Right sided fetal hydronephrosis is noted measuring 1.2cm with megaureter noted.  Amniotic fluid volume is normal.   Placenta is posterior.  Biophysical profile is 8/8.    ASSESSMENT/PLAN:     43 y.o.  female with IUP at 31w1d    - Chronic hypertension affecting pregnancy  Today I counseled the patient on maternal/fetal risks associated with CHTN during pregnancy. Risks include but not limited to fetal growth restriction, miscarriage, abruption, maternal end organ disease (renal failure, MI, and stroke),  delivery, development of superimposed preeclampsia, and eclampsia. She was counseled on the recommendations for blood pressure control, serial ultrasound for fetal growth assessment and  testing, and timing of delivery. I also counseled her on the recommendation for aspirin 81 mg daily which may decrease her risk of developing superimposed preeclampsia.     BP mild range. She is on 3 medications and was discharged yesterday from observation. Last labs 2 days ago. She is feeling well. No proteinuria.    Recommendations (Please refer to [facility] guidelines):  -Initiate aspirin 81 mg daily at 12-16 weeks gestation for preeclampsia risk reduction  - Current regimen: labetalol 200mg TID, nifedipine 90 XL BID, Hydralazine 50mg QID  -Baseline evaluation with primary OB:   24-hour urine protein or baseline P/C ratio, CMP, and CBC (has not completed baseline urine protein testing)  Maternal EKG  Maternal ophthalmic evaluation  Maternal echocardiogram if HTN has been long-standing or EKG is abnormal  -Serial fetal growth ultrasounds every 4-6 weeks, beginning at 26-28 weeks.   -Continued close observation of patient's blood pressures. Avoid hypotension as this has been associated with uteroplacental insufficiency.  -In conjunction with the CHAP study recommendation for BP control: If BP is persistently ?140/90 antihypertensive medication is recommended with goal BP of  120-140/80-90.  -Weekly antepartum testing at 32 weeks (NST+AFV); twice weekly testing if control is poor, multiple comorbidities are present, or requires several medications for control   -Delivery timing:  No medications, no comorbid conditions: 39 0/7 - 39 6/7 weeks gestation  No medications, comorbid conditions: 38 0/7 - 38 6/7 weeks gestation  Controlled on single agent, no comorbid conditions: 38 0/7 - 38 6/7 weeks gestation  Controlled on single agent, comorbid conditions: 37 0/7 - 38 6/7 weeks gestation  Uncontrolled or requiring ? 2 medications: 36 0/7 - 37 6/7 weeks gestation-- 36 weeks at latest advised    Comorbid conditions include BMI >= 40, diabetes, and complex medical condition associated with placental dysfunction (ie lupus or other vascular disease)  Delivery may be recommended earlier pending results of fetal growth ultrasounds, AFV assessment, or antepartum testing results.        - History of prior pregnancy with IUGR   I reviewed the patient's obstetric history which is remarkable for a previous pregnancy complicated by FGR. Other conditions noted during that pregnancy include: maternal history of hypertension. I counseled the patient regarding the recurrence risk for FGR (however the exact number is not clear, likely ~15-20% recurrence.). We discussed recommendations for management during this pregnancy including evaluation of fetal growth in the third trimester. Low molecular weight heparin and aspirin have not been beneficial in preventing recurrent FGR. If the patient had preeclampsia or gestational hypertension associated with FGR, low dose aspirin therapy should be initiated at 12-16 weeks in subsequent pregnancies. Patient was counseled on modifiable risk factors including tobacco cessation, abstinence from alcohol and drug use, and maternal diet (ensuring appropriate gestational weight gain).     Recommendations:  -Fetal growth ultrasounds in third trimester, every 4  weeks  -Antepartum surveillance is not indicated in absence of FGR diagnosis or other maternal-fetal indications for prenatal testing  -Monitor gestational weight gain, encourage prenatal vitamin      - Multigravida of advanced maternal age in third trimester  Today I counseled the patient on the relationship between maternal age and fetal aneuploidy.  We discussed the risks and benefits of screening tests versus definitive genetic testing (amniocentesis). She was counseled about her specific age-related risk of fetal aneuploidy. We discussed the limitations of ultrasound in the definitive diagnosis of fetal aneuploidy.  I quoted the patient a 1 in 900 procedure related risk of fetal loss with genetic amniocentesis.  We also discussed cell free fetal DNA screening including the sensitivity and specificity of the test.  Her questions were answered and after today's consultation she did not want to pursue amniocentesis.  She did not want to pursue NIPT.  Additionally, we discussed the association between advanced maternal age (AMA) and preeclampsia, gestational diabetes, and fetal growth restriction.    Recommendations:  Detailed anatomic survey at 19-20 weeks    Follow-up fetal ultrasound at 32-34 weeks for interval fetal growth  Consider low dose aspirin at 12-16 weeks for preeclampsia risk reduction      - Recurrent pregnancy loss in pregnant patient in third trimester, antepartum  Miscarriage is the most common complication of early pregnancy. An estimated 8-20% of clinically recognized pregnancies less than 20 weeks of gestation will miscarry. 80% of miscarriages happen in the first 12 weeks of gestation. Chromosomal abnormalities account for approximately 50% of all miscarriages and the earlier the gestational age at miscarriage the higher the incidence. Other etiologies for miscarriage include congenital anomalies, maternal uterine anomalies, poorly controlled thyroid disease or diabetes, and acute maternal  infection. Some miscarriages are unexplained. Recurrent pregnancy loss (RPL) refers to three or more consecutive losses of clinically recognized pregnancies prior to 20 weeks of gestation. While approximately 15% of women experience 1 miscarriage, only 2% experience 2 consecutive losses and less than 1% experience 3 consecutive losses.   In women with a history of two or more prior losses, the chance of a viable birth after ultrasound  detection of fetal cardiac activity is approximately 77%. ?  After evaluation, recurrent pregnancy loss remains unexplained in approximately one-half of couples. Nevertheless, the chance of a live birth after three unexplained RPLs is over 50 percent with no intervention.     Recommendations:  Evaluation for possible antiphospholipid antibody syndrome with anticardiolipin antibody (IgG and IgM) titer, lupus anticoagulant, and beta-2 glycoprotein IgG and IgM titers - order providedAn inherited thrombophilia panel is not recommended for RPLEvaluation of thyroid function with TSH and for maternal diabetes with 2 hour glucose tolerance test or hemoglobin A1C  Please re-consult us should any of these be abnormal.        FOLLOW UP:   F/u weekly MFM and weekly with Dr. Coreas    This consultation was completed with the assistance of Raya Bowles NP.        Jaimee Hwang MD  Maternal Fetal Medicine

## 2024-05-02 NOTE — DISCHARGE SUMMARY
[facility] - Antepartum  Discharge Summary  OBGYN    Admission date: 4/30/2024  Discharge date: 4/30/2024    Admit Dx:    Hypertension affecting pregnancy in third trimester [O16.3]  Discharge Dx:  Hypertension affecting pregnancy in third trimester [O16.3]    Attending Physician: Casey Coreas   Discharge Provider: Casey Coreas    Procedures Performed: * No surgery found *    Hospital Course: Patient was admitted on 4/30/2024 . Barring any unforseen incidents, patient will be discharged home when she is able to ambulate, void, and tolerate PO intake.    Consults: none      Discharged Condition: stable    Disposition: Home    Follow Up:   1 weeks in office.

## 2024-05-03 NOTE — ASSESSMENT & PLAN NOTE
Today I counseled the patient on maternal/fetal risks associated with CHTN during pregnancy. Risks include but not limited to fetal growth restriction, miscarriage, abruption, maternal end organ disease (renal failure, MI, and stroke),  delivery, development of superimposed preeclampsia, and eclampsia. She was counseled on the recommendations for blood pressure control, serial ultrasound for fetal growth assessment and  testing, and timing of delivery. I also counseled her on the recommendation for aspirin 81 mg daily which may decrease her risk of developing superimposed preeclampsia.     BP mild range. She is on 3 medications and was discharged yesterday from observation. Last labs 2 days ago. She is feeling well. No proteinuria.    Recommendations ([facility]):  -Initiate aspirin 81 mg daily at 12-16 weeks gestation for preeclampsia risk reduction  - Current regimen: labetalol 200mg TID, nifedipine 90 XL BID, Hydralazine 50mg QID  -Baseline evaluation with primary OB:   24-hour urine protein or baseline P/C ratio, CMP, and CBC (has not completed baseline urine protein testing)  Maternal EKG  Maternal ophthalmic evaluation  Maternal echocardiogram if HTN has been long-standing or EKG is abnormal  -Serial fetal growth ultrasounds every 4-6 weeks, beginning at 26-28 weeks.   -Continued close observation of patient's blood pressures. Avoid hypotension as this has been associated with uteroplacental insufficiency.  -In conjunction with the CHAP study recommendation for BP control: If BP is persistently ?140/90 antihypertensive medication is recommended with goal BP of 120-140/80-90.  -Weekly antepartum testing at 32 weeks (NST+AFV); twice weekly testing if control is poor, multiple comorbidities are present, or requires several medications for control   -Delivery timing:  No medications, no comorbid conditions: 39 0/7 - 39 6/7 weeks gestation  No medications, comorbid  conditions: 38 0/7 - 38 6/7 weeks gestation  Controlled on single agent, no comorbid conditions: 38 0/7 - 38 6/7 weeks gestation  Controlled on single agent, comorbid conditions: 37 0/7 - 38 6/7 weeks gestation  Uncontrolled or requiring ? 2 medications: 36 0/7 - 37 6/7 weeks gestation-- 36 weeks at latest advised    Comorbid conditions include BMI >= 40, diabetes, and complex medical condition associated with placental dysfunction (ie lupus or other vascular disease)  Delivery may be recommended earlier pending results of fetal growth ultrasounds, AFV assessment, or antepartum testing results.

## 2024-05-06 ENCOUNTER — OFFICE VISIT (OUTPATIENT)
Dept: MATERNAL FETAL MEDICINE | Facility: CLINIC | Age: 44
End: 2024-05-06
Payer: MEDICAID

## 2024-05-06 ENCOUNTER — PROCEDURE VISIT (OUTPATIENT)
Dept: MATERNAL FETAL MEDICINE | Facility: CLINIC | Age: 44
End: 2024-05-06
Payer: MEDICAID

## 2024-05-06 VITALS
DIASTOLIC BLOOD PRESSURE: 75 MMHG | BODY MASS INDEX: 26.49 KG/M2 | WEIGHT: 155.19 LBS | SYSTOLIC BLOOD PRESSURE: 129 MMHG | HEART RATE: 67 BPM | HEIGHT: 64 IN

## 2024-05-06 DIAGNOSIS — O35.EXX0 FETAL HYDRONEPHROSIS DURING PREGNANCY, ANTEPARTUM, SINGLE OR UNSPECIFIED FETUS: ICD-10-CM

## 2024-05-06 DIAGNOSIS — O26.23 RECURRENT PREGNANCY LOSS IN PREGNANT PATIENT IN THIRD TRIMESTER, ANTEPARTUM: ICD-10-CM

## 2024-05-06 DIAGNOSIS — O10.919 CHRONIC HYPERTENSION AFFECTING PREGNANCY: Primary | ICD-10-CM

## 2024-05-06 DIAGNOSIS — O09.523 MULTIGRAVIDA OF ADVANCED MATERNAL AGE IN THIRD TRIMESTER: ICD-10-CM

## 2024-05-06 DIAGNOSIS — Z87.59 HISTORY OF PRIOR PREGNANCY WITH IUGR NEWBORN: ICD-10-CM

## 2024-05-06 DIAGNOSIS — O10.919 CHRONIC HYPERTENSION AFFECTING PREGNANCY: ICD-10-CM

## 2024-05-06 PROCEDURE — 99214 OFFICE O/P EST MOD 30 MIN: CPT | Mod: TH,S$GLB,, | Performed by: OBSTETRICS & GYNECOLOGY

## 2024-05-06 PROCEDURE — 3008F BODY MASS INDEX DOCD: CPT | Mod: CPTII,S$GLB,, | Performed by: OBSTETRICS & GYNECOLOGY

## 2024-05-06 PROCEDURE — 1159F MED LIST DOCD IN RCRD: CPT | Mod: CPTII,S$GLB,, | Performed by: OBSTETRICS & GYNECOLOGY

## 2024-05-06 PROCEDURE — 3074F SYST BP LT 130 MM HG: CPT | Mod: CPTII,S$GLB,, | Performed by: OBSTETRICS & GYNECOLOGY

## 2024-05-06 PROCEDURE — 76819 FETAL BIOPHYS PROFIL W/O NST: CPT | Mod: S$GLB,,, | Performed by: OBSTETRICS & GYNECOLOGY

## 2024-05-06 PROCEDURE — 3078F DIAST BP <80 MM HG: CPT | Mod: CPTII,S$GLB,, | Performed by: OBSTETRICS & GYNECOLOGY

## 2024-05-06 PROCEDURE — 1160F RVW MEDS BY RX/DR IN RCRD: CPT | Mod: CPTII,S$GLB,, | Performed by: OBSTETRICS & GYNECOLOGY

## 2024-05-06 NOTE — ASSESSMENT & PLAN NOTE
Right sided fetal hydronephrosis measuring 11.6mm was noted consistent with UTDA2. The kidneys appear normal. The bladder and ureter appears enlarged.  Amniotic fluid volume is normal.  She has not undergone any genetic screening for this pregnancy.     The finding of renal pelvis dilation was discussed with the patient. We reviewed basic anatomy of the renal collecting system and then discussed possible etiologies for renal pelvis dilation/ hydronephrosis. When dilation of the renal pelvis is borderline or mild, most cases will resolve spontaneously. The greater the amount of dilation/hydronephrosis, the more likely the post- abnormality. If the renal dilation persists in the  period, the most common conditions that can cause renal pelvis dilation are UPJ obstruction (ureteropelvic junction), UVJ obstruction (ureterovesical junction) and VUR (vesicoureteral reflux). These conditions predispose infants/children to urinary tract infection, but can be effectively followed and treated. I explained that the vast majority of infants with these findings will have spontaneous resolution during pregnancy. We will plan to re-evaluate the kidneys at her next office visit in 4 weeks.     Given these findings, underlying genetic conditions that can be associated with renal abnormalities were discussed.  Down syndrome can be associated with renal pyelectasis during pregnancy, therefore we discussed screening for this condition.  NIPT testing was offered and discussed, including risks and benefits of testing.  I discussed with the couple that I do not see any other findings on the ultrasound suggestive of Down syndrome, however some features can be subtle and missed during the prenatal period.  She also would be a candidate for amniocentesis if she desired, as any patient can elect to undergo definitive testing in pregnancy.  She politely declines biochemical screening.    The current findings are unlikely to result  in significant renal abnormality after birth, with an estimated  pathology diagnosis of 11-15%.  Although this is unlikely, she was offered prenatal consultation with pediatric Nephrology to discuss plans for after delivery.  At minimum, the  should have repeat ultrasound after delivery to assess for hydronephrosis. She politely declines urology/nephrology consult.

## 2024-05-06 NOTE — PROGRESS NOTES
"lMaternal Fetal Medicine follow up consult    SUBJECTIVE:     Sonja Hoff is a 43 y.o.  female with IUP at 31w4d who is seen in follow up consultation by MFM.  Pregnancy complications include:   Problem   - Fetal hydronephrosis during pregnancy, antepartum   - Multigravida of advanced maternal age in third trimester   - Chronic hypertension affecting pregnancy   - Recurrent pregnancy loss in pregnant patient in third trimester, antepartum   - History of prior pregnancy with IUGR      Sonja presents for routine follow up appointment.  BP today 129/75. Denies neuro symptoms of superimposed disease.  Denies LOF, VB, contractions. Positive fetal movement.    Previous notes reviewed.   No changes to medical, surgical, family, social, or obstetric history.    Interval history since last MFM visit: see above    Medications reviewed.    Care team members:  Dr Coreas - Primary OB       OBJECTIVE:   /75 (BP Location: Right arm)   Pulse 67   Ht 5' 4" (1.626 m)   Wt 70.4 kg (155 lb 3.3 oz)   LMP 2023 (Exact Date)   BMI 26.64 kg/m²     Ultrasound performed. See viewpoint for full ultrasound report.    A viable pruett pregnancy is seen in breech presentation.   No new abnormalities are seen.  Amniotic fluid volume is normal.   Placenta is posterior.  Biophysical profile is 8/8.    ASSESSMENT/PLAN:     43 y.o.  female with IUP at 31w4d    - Chronic hypertension affecting pregnancy  Previously counseled the patient on maternal/fetal risks associated with CHTN during pregnancy. Risks include but not limited to fetal growth restriction, miscarriage, abruption, maternal end organ disease (renal failure, MI, and stroke),  delivery, development of superimposed preeclampsia, and eclampsia.     24- Currently on Hydralazine 50mg QID, Nifedipine 90mg BID, and Labetalol 200 TID. Doing well without complaints. /75    Recommendations (Please refer to [facility] " guidelines):  -Initiate aspirin 81 mg daily at 12-16 weeks gestation for preeclampsia risk reduction  - Current regimen: labetalol 200mg TID, nifedipine 90 XL BID, Hydralazine 50mg QID  -Baseline evaluation with primary OB:   24-hour urine protein or baseline P/C ratio, CMP, and CBC -  done  Maternal EKG  Maternal ophthalmic evaluation  Maternal echocardiogram if HTN has been long-standing or EKG is abnormal  -Serial fetal growth ultrasounds every 4-6 weeks, beginning at 26-28 weeks.   -Continued close observation of patient's blood pressures. Avoid hypotension as this has been associated with uteroplacental insufficiency.  -In conjunction with the CHAP study recommendation for BP control: If BP is persistently ?140/90 antihypertensive medication is recommended with goal BP of 120-140/80-90.  -Weekly antepartum testing at 32 weeks (NST+AFV); twice weekly testing if control is poor, multiple comorbidities are present, or requires several medications for control   -Delivery timing:  No medications, no comorbid conditions: 39 0/7 - 39 6/7 weeks gestation  No medications, comorbid conditions: 38 0/7 - 38 6/7 weeks gestation  Controlled on single agent, no comorbid conditions: 38 0/7 - 38 6/7 weeks gestation  Controlled on single agent, comorbid conditions: 37 0/7 - 38 6/7 weeks gestation  Uncontrolled or requiring ? 2 medications: 36 weeks at latest advised    Comorbid conditions include BMI >= 40, diabetes, and complex medical condition associated with placental dysfunction (ie lupus or other vascular disease)  Delivery may be recommended earlier pending results of fetal growth ultrasounds, AFV assessment, or antepartum testing results.        - Recurrent pregnancy loss in pregnant patient in third trimester, antepartum  Miscarriage is the most common complication of early pregnancy. An estimated 8-20% of clinically recognized pregnancies less than 20 weeks of gestation will miscarry. 80% of miscarriages happen in the  first 12 weeks of gestation. Chromosomal abnormalities account for approximately 50% of all miscarriages and the earlier the gestational age at miscarriage the higher the incidence. Other etiologies for miscarriage include congenital anomalies, maternal uterine anomalies, poorly controlled thyroid disease or diabetes, and acute maternal infection. Some miscarriages are unexplained. Recurrent pregnancy loss (RPL) refers to three or more consecutive losses of clinically recognized pregnancies prior to 20 weeks of gestation. While approximately 15% of women experience 1 miscarriage, only 2% experience 2 consecutive losses and less than 1% experience 3 consecutive losses.   In women with a history of two or more prior losses, the chance of a viable birth after ultrasound  detection of fetal cardiac activity is approximately 77%. ?  After evaluation, recurrent pregnancy loss remains unexplained in approximately one-half of couples. Nevertheless, the chance of a live birth after three unexplained RPLs is over 50 percent with no intervention.     Recommendations:  Evaluation for possible antiphospholipid antibody syndrome with anticardiolipin antibody (IgG and IgM) titer, lupus anticoagulant, and beta-2 glycoprotein IgG and IgM titers - order providedAn inherited thrombophilia panel is not recommended for RPLEvaluation of thyroid function with TSH and for maternal diabetes with 2 hour glucose tolerance test or hemoglobin A1C  Please re-consult us should any of these be abnormal.      - Multigravida of advanced maternal age in third trimester  Previously counseled the patient on the relationship between maternal age and fetal aneuploidy.    Additionally, we discussed the association between advanced maternal age (AMA) and preeclampsia, gestational diabetes, and fetal growth restriction.    She politely declined biochemical screening and invasive diagnostic testing.    Recommendations:  Detailed anatomic survey at 19-20  weeks    Follow-up fetal ultrasound at 32-34 weeks for interval fetal growth  Consider low dose aspirin at 12-16 weeks for preeclampsia risk reduction      - History of prior pregnancy with IUGR   I reviewed the patient's obstetric history which is remarkable for a previous pregnancy complicated by FGR. Other conditions noted during that pregnancy include: maternal history of hypertension. I counseled the patient regarding the recurrence risk for FGR (however the exact number is not clear, likely ~15-20% recurrence.). We discussed recommendations for management during this pregnancy including evaluation of fetal growth in the third trimester. Low molecular weight heparin and aspirin have not been beneficial in preventing recurrent FGR. If the patient had preeclampsia or gestational hypertension associated with FGR, low dose aspirin therapy should be initiated at 12-16 weeks in subsequent pregnancies. Patient was counseled on modifiable risk factors including tobacco cessation, abstinence from alcohol and drug use, and maternal diet (ensuring appropriate gestational weight gain).     Recommendations:  -Fetal growth ultrasounds in third trimester, every 4 weeks  -Antepartum surveillance is not indicated in absence of FGR diagnosis or other maternal-fetal indications for prenatal testing  -Monitor gestational weight gain, encourage prenatal vitamin      - Fetal hydronephrosis during pregnancy, antepartum  Right sided fetal hydronephrosis measuring 11.6mm was noted consistent with UTDA2. The kidneys appear normal. The bladder and ureter appears enlarged.  Amniotic fluid volume is normal.  She has not undergone any genetic screening for this pregnancy.     The finding of renal pelvis dilation was discussed with the patient. We reviewed basic anatomy of the renal collecting system and then discussed possible etiologies for renal pelvis dilation/ hydronephrosis. When dilation of the renal pelvis is borderline or  mild, most cases will resolve spontaneously. The greater the amount of dilation/hydronephrosis, the more likely the post- abnormality. If the renal dilation persists in the  period, the most common conditions that can cause renal pelvis dilation are UPJ obstruction (ureteropelvic junction), UVJ obstruction (ureterovesical junction) and VUR (vesicoureteral reflux). These conditions predispose infants/children to urinary tract infection, but can be effectively followed and treated. I explained that the vast majority of infants with these findings will have spontaneous resolution during pregnancy. We will plan to re-evaluate the kidneys at her next office visit in 4 weeks.     Given these findings, underlying genetic conditions that can be associated with renal abnormalities were discussed.  Down syndrome can be associated with renal pyelectasis during pregnancy, therefore we discussed screening for this condition.  NIPT testing was offered and discussed, including risks and benefits of testing.  I discussed with the couple that I do not see any other findings on the ultrasound suggestive of Down syndrome, however some features can be subtle and missed during the prenatal period.  She also would be a candidate for amniocentesis if she desired, as any patient can elect to undergo definitive testing in pregnancy.  She politely declines biochemical screening.    The current findings are unlikely to result in significant renal abnormality after birth, with an estimated  pathology diagnosis of 11-15%.  Although this is unlikely, she was offered prenatal consultation with pediatric Nephrology to discuss plans for after delivery.  At minimum, the  should have repeat ultrasound after delivery to assess for hydronephrosis. She politely declines urology/nephrology consult.    F/u 1 wk for BPP  F/u in 2 weeks for [facility] visit /growth ultrasound    Jaimee Hwang MD  Maternal Fetal Medicine

## 2024-05-06 NOTE — ASSESSMENT & PLAN NOTE
Previously counseled the patient on maternal/fetal risks associated with CHTN during pregnancy. Risks include but not limited to fetal growth restriction, miscarriage, abruption, maternal end organ disease (renal failure, MI, and stroke),  delivery, development of superimposed preeclampsia, and eclampsia.     24- Currently on Hydralazine 50mg QID, Nifedipine 90mg BID, and Labetalol 200 TID. Doing well without complaints. /75    Recommendations (Please refer to [facility] guidelines):  -Initiate aspirin 81 mg daily at 12-16 weeks gestation for preeclampsia risk reduction  - Current regimen: labetalol 200mg TID, nifedipine 90 XL BID, Hydralazine 50mg QID  -Baseline evaluation with primary OB:   24-hour urine protein or baseline P/C ratio, CMP, and CBC -  done  Maternal EKG  Maternal ophthalmic evaluation  Maternal echocardiogram if HTN has been long-standing or EKG is abnormal  -Serial fetal growth ultrasounds every 4-6 weeks, beginning at 26-28 weeks.   -Continued close observation of patient's blood pressures. Avoid hypotension as this has been associated with uteroplacental insufficiency.  -In conjunction with the CHAP study recommendation for BP control: If BP is persistently ?140/90 antihypertensive medication is recommended with goal BP of 120-140/80-90.  -Weekly antepartum testing at 32 weeks (NST+AFV); twice weekly testing if control is poor, multiple comorbidities are present, or requires several medications for control   -Delivery timing:  No medications, no comorbid conditions: 39 0/7 - 39 6/7 weeks gestation  No medications, comorbid conditions: 38 0/7 - 38 6/7 weeks gestation  Controlled on single agent, no comorbid conditions: 38 0/7 - 38 6/7 weeks gestation  Controlled on single agent, comorbid conditions: 37 0/7 - 38 6/7 weeks gestation  Uncontrolled or requiring ? 2 medications: 36 weeks at latest advised    Comorbid conditions include BMI >= 40, diabetes, and complex medical  condition associated with placental dysfunction (ie lupus or other vascular disease)  Delivery may be recommended earlier pending results of fetal growth ultrasounds, AFV assessment, or antepartum testing results.

## 2024-05-06 NOTE — ASSESSMENT & PLAN NOTE
Previously counseled the patient on the relationship between maternal age and fetal aneuploidy.    Additionally, we discussed the association between advanced maternal age (AMA) and preeclampsia, gestational diabetes, and fetal growth restriction.    She politely declined biochemical screening and invasive diagnostic testing.    Recommendations:  Detailed anatomic survey at 19-20 weeks    Follow-up fetal ultrasound at 32-34 weeks for interval fetal growth  Consider low dose aspirin at 12-16 weeks for preeclampsia risk reduction

## 2024-05-07 DIAGNOSIS — O26.23 RECURRENT PREGNANCY LOSS IN PREGNANT PATIENT IN THIRD TRIMESTER, ANTEPARTUM: ICD-10-CM

## 2024-05-07 DIAGNOSIS — O10.919 CHRONIC HYPERTENSION AFFECTING PREGNANCY: Primary | ICD-10-CM

## 2024-05-07 DIAGNOSIS — O09.523 MULTIGRAVIDA OF ADVANCED MATERNAL AGE IN THIRD TRIMESTER: ICD-10-CM

## 2024-05-13 ENCOUNTER — OFFICE VISIT (OUTPATIENT)
Dept: MATERNAL FETAL MEDICINE | Facility: CLINIC | Age: 44
End: 2024-05-13
Payer: MEDICAID

## 2024-05-13 ENCOUNTER — PROCEDURE VISIT (OUTPATIENT)
Dept: MATERNAL FETAL MEDICINE | Facility: CLINIC | Age: 44
End: 2024-05-13
Payer: MEDICAID

## 2024-05-13 VITALS
WEIGHT: 156.06 LBS | SYSTOLIC BLOOD PRESSURE: 138 MMHG | HEIGHT: 64 IN | HEART RATE: 87 BPM | DIASTOLIC BLOOD PRESSURE: 84 MMHG | BODY MASS INDEX: 26.64 KG/M2

## 2024-05-13 DIAGNOSIS — O10.919 CHRONIC HYPERTENSION AFFECTING PREGNANCY: ICD-10-CM

## 2024-05-13 DIAGNOSIS — O35.EXX0 FETAL HYDRONEPHROSIS DURING PREGNANCY, ANTEPARTUM, SINGLE OR UNSPECIFIED FETUS: ICD-10-CM

## 2024-05-13 DIAGNOSIS — O09.523 MULTIGRAVIDA OF ADVANCED MATERNAL AGE IN THIRD TRIMESTER: ICD-10-CM

## 2024-05-13 DIAGNOSIS — O26.23 RECURRENT PREGNANCY LOSS IN PREGNANT PATIENT IN THIRD TRIMESTER, ANTEPARTUM: ICD-10-CM

## 2024-05-13 DIAGNOSIS — O10.919 CHRONIC HYPERTENSION AFFECTING PREGNANCY: Primary | ICD-10-CM

## 2024-05-13 PROCEDURE — 3008F BODY MASS INDEX DOCD: CPT | Mod: CPTII,S$GLB,, | Performed by: OBSTETRICS & GYNECOLOGY

## 2024-05-13 PROCEDURE — 3079F DIAST BP 80-89 MM HG: CPT | Mod: CPTII,S$GLB,, | Performed by: OBSTETRICS & GYNECOLOGY

## 2024-05-13 PROCEDURE — 99213 OFFICE O/P EST LOW 20 MIN: CPT | Mod: TH,S$GLB,, | Performed by: OBSTETRICS & GYNECOLOGY

## 2024-05-13 PROCEDURE — 1160F RVW MEDS BY RX/DR IN RCRD: CPT | Mod: CPTII,S$GLB,, | Performed by: OBSTETRICS & GYNECOLOGY

## 2024-05-13 PROCEDURE — 1159F MED LIST DOCD IN RCRD: CPT | Mod: CPTII,S$GLB,, | Performed by: OBSTETRICS & GYNECOLOGY

## 2024-05-13 PROCEDURE — 3075F SYST BP GE 130 - 139MM HG: CPT | Mod: CPTII,S$GLB,, | Performed by: OBSTETRICS & GYNECOLOGY

## 2024-05-13 PROCEDURE — 76819 FETAL BIOPHYS PROFIL W/O NST: CPT | Mod: S$GLB,,, | Performed by: OBSTETRICS & GYNECOLOGY

## 2024-05-13 NOTE — ASSESSMENT & PLAN NOTE
Previously counseled the patient on maternal/fetal risks associated with CHTN during pregnancy. Risks include but not limited to fetal growth restriction, miscarriage, abruption, maternal end organ disease (renal failure, MI, and stroke),  delivery, development of superimposed preeclampsia, and eclampsia.     24- Currently on Hydralazine 50mg QID, Nifedipine 90mg BID, and Labetalol 200 TID. Doing well without complaints. /75  24- /84    Recommendations ([facility]  -Initiate aspirin 81 mg daily at 12-16 weeks gestation for preeclampsia risk reduction  - Current regimen: labetalol 200mg TID, nifedipine 90 XL BID, Hydralazine 50mg QID  -Baseline evaluation with primary OB:   24-hour urine protein or baseline P/C ratio, CMP, and CBC -  done  Maternal EKG  Maternal ophthalmic evaluation  Maternal echocardiogram if HTN has been long-standing or EKG is abnormal  -Serial fetal growth ultrasounds every 4-6 weeks, beginning at 26-28 weeks.   -Continued close observation of patient's blood pressures. Avoid hypotension as this has been associated with uteroplacental insufficiency.  -In conjunction with the CHAP study recommendation for BP control: If BP is persistently ?140/90 antihypertensive medication is recommended with goal BP of 120-140/80-90.  -Weekly antepartum testing at 32 weeks (NST+AFV); twice weekly testing if control is poor, multiple comorbidities are present, or requires several medications for control   -Delivery timing:  No medications, no comorbid conditions: 39 0/7 - 39 6/7 weeks gestation  No medications, comorbid conditions: 38 0/7 - 38 6/7 weeks gestation  Controlled on single agent, no comorbid conditions: 38 0/7 - 38 6/7 weeks gestation  Controlled on single agent, comorbid conditions: 37 0/7 - 38 6/7 weeks gestation  Uncontrolled or requiring ? 2 medications: 36 weeks at latest advised    Comorbid conditions include BMI >= 40, diabetes, and  complex medical condition associated with placental dysfunction (ie lupus or other vascular disease)  Delivery may be recommended earlier pending results of fetal growth ultrasounds, AFV assessment, or antepartum testing results.

## 2024-05-13 NOTE — PROGRESS NOTES
"Maternal Fetal Medicine follow up consult    SUBJECTIVE:     Sonja Hoff is a 43 y.o.  female with IUP at 32w4d who is seen in follow up consultation by [facility].  Pregnancy complications include:   Problem   - Fetal hydronephrosis during pregnancy, antepartum   - Chronic hypertension affecting pregnancy     Sonja presents for routine follow up appointment.  She is doing great. Bps well controlled with current regimen.   Denies LOF, VB, contractions. Positive fetal movement.    Previous notes reviewed.   No changes to medical, surgical, family, social, or obstetric history.    Interval history since last MFM visit: see above    Medications reviewed.    Care team members:  Dr Coreas - Primary OB     OBJECTIVE:   /84 (BP Location: Right arm)   Pulse 87   Ht 5' 4" (1.626 m)   Wt 70.8 kg (156 lb 1.4 oz)   LMP 2023 (Exact Date)   BMI 26.79 kg/m²     Ultrasound performed. See viewpoint for full ultrasound report.    A viable pruett pregnancy is seen in breech presentation.   Hydronephrosis is bilateral today, measuring 1.6cm bilaterally with right megaureter.  Amniotic fluid volume is normal.   Placenta is posterior.  Biophysical profile is 8/8.    ASSESSMENT/PLAN:     43 y.o.  female with IUP at 32w4d    - Chronic hypertension affecting pregnancy  Previously counseled the patient on maternal/fetal risks associated with CHTN during pregnancy. Risks include but not limited to fetal growth restriction, miscarriage, abruption, maternal end organ disease (renal failure, MI, and stroke),  delivery, development of superimposed preeclampsia, and eclampsia.     24- Currently on Hydralazine 50mg QID, Nifedipine 90mg BID, and Labetalol 200 TID. Doing well without complaints. /75  24- /84    Recommendations (Please refer to [facility] guidelines):  -Initiate aspirin 81 mg daily at 12-16 weeks gestation for preeclampsia risk reduction  - Current regimen: labetalol 200mg " TID, nifedipine 90 XL BID, Hydralazine 50mg QID  -Baseline evaluation with primary OB:   24-hour urine protein or baseline P/C ratio, CMP, and CBC -  done  Maternal EKG  Maternal ophthalmic evaluation  Maternal echocardiogram if HTN has been long-standing or EKG is abnormal  -Serial fetal growth ultrasounds every 4-6 weeks, beginning at 26-28 weeks.   -Continued close observation of patient's blood pressures. Avoid hypotension as this has been associated with uteroplacental insufficiency.  -In conjunction with the CHAP study recommendation for BP control: If BP is persistently ?140/90 antihypertensive medication is recommended with goal BP of 120-140/80-90.  -Weekly antepartum testing at 32 weeks (NST+AFV); twice weekly testing if control is poor, multiple comorbidities are present, or requires several medications for control   -Delivery timing:  No medications, no comorbid conditions: 39 0/7 - 39 6/7 weeks gestation  No medications, comorbid conditions: 38 0/7 - 38 6/7 weeks gestation  Controlled on single agent, no comorbid conditions: 38 0/7 - 38 6/7 weeks gestation  Controlled on single agent, comorbid conditions: 37 0/7 - 38 6/7 weeks gestation  Uncontrolled or requiring ? 2 medications: 36 weeks at latest advised    Comorbid conditions include BMI >= 40, diabetes, and complex medical condition associated with placental dysfunction (ie lupus or other vascular disease)  Delivery may be recommended earlier pending results of fetal growth ultrasounds, AFV assessment, or antepartum testing results.        - Fetal hydronephrosis during pregnancy, antepartum  Right sided fetal hydronephrosis measuring 11.6mm was noted consistent with UTDA2. The kidneys appear normal. The bladder and ureter appears enlarged.  Amniotic fluid volume is normal.  She has not undergone any genetic screening for this pregnancy.     The finding of renal pelvis dilation was discussed with the patient. We reviewed basic anatomy of the renal  collecting system and then discussed possible etiologies for renal pelvis dilation/ hydronephrosis. When dilation of the renal pelvis is borderline or mild, most cases will resolve spontaneously. The greater the amount of dilation/hydronephrosis, the more likely the post-mechelle abnormality. If the renal dilation persists in the  period, the most common conditions that can cause renal pelvis dilation are UPJ obstruction (ureteropelvic junction), UVJ obstruction (ureterovesical junction) and VUR (vesicoureteral reflux). These conditions predispose infants/children to urinary tract infection, but can be effectively followed and treated. I explained that the vast majority of infants with these findings will have spontaneous resolution during pregnancy. We will plan to re-evaluate the kidneys at her next office visit in 4 weeks.     Given these findings, underlying genetic conditions that can be associated with renal abnormalities were discussed.  Down syndrome can be associated with renal pyelectasis during pregnancy, therefore we discussed screening for this condition.  NIPT testing was offered and discussed, including risks and benefits of testing.  I discussed with the couple that I do not see any other findings on the ultrasound suggestive of Down syndrome, however some features can be subtle and missed during the prenatal period.  She also would be a candidate for amniocentesis if she desired, as any patient can elect to undergo definitive testing in pregnancy.  She politely declines biochemical screening.    The current findings are unlikely to result in significant renal abnormality after birth, with an estimated  pathology diagnosis of 11-15%.  Although this is unlikely, she was offered prenatal consultation with pediatric Nephrology to discuss plans for after delivery.  At minimum, the  should have repeat ultrasound after delivery to assess for hydronephrosis. She politely declines  urology/nephrology consult.    5/13/24: Bilateral hydronephrosis measuring 1.6cm. She is aware of higher likelihood of needing follow up after delivery. She is aware underlying health concerns have not been effectively eliminated (reduced) as she has not had screening this pregnancy.       F/u in 1 weeks for MFM visit /growth ultrasound    Jaimee Hwang MD  Maternal Fetal Medicine

## 2024-05-13 NOTE — ASSESSMENT & PLAN NOTE
Right sided fetal hydronephrosis measuring 11.6mm was noted consistent with UTDA2. The kidneys appear normal. The bladder and ureter appears enlarged.  Amniotic fluid volume is normal.  She has not undergone any genetic screening for this pregnancy.     The finding of renal pelvis dilation was discussed with the patient. We reviewed basic anatomy of the renal collecting system and then discussed possible etiologies for renal pelvis dilation/ hydronephrosis. When dilation of the renal pelvis is borderline or mild, most cases will resolve spontaneously. The greater the amount of dilation/hydronephrosis, the more likely the post- abnormality. If the renal dilation persists in the  period, the most common conditions that can cause renal pelvis dilation are UPJ obstruction (ureteropelvic junction), UVJ obstruction (ureterovesical junction) and VUR (vesicoureteral reflux). These conditions predispose infants/children to urinary tract infection, but can be effectively followed and treated. I explained that the vast majority of infants with these findings will have spontaneous resolution during pregnancy. We will plan to re-evaluate the kidneys at her next office visit in 4 weeks.     Given these findings, underlying genetic conditions that can be associated with renal abnormalities were discussed.  Down syndrome can be associated with renal pyelectasis during pregnancy, therefore we discussed screening for this condition.  NIPT testing was offered and discussed, including risks and benefits of testing.  I discussed with the couple that I do not see any other findings on the ultrasound suggestive of Down syndrome, however some features can be subtle and missed during the prenatal period.  She also would be a candidate for amniocentesis if she desired, as any patient can elect to undergo definitive testing in pregnancy.  She politely declines biochemical screening.    The current findings are unlikely to result  in significant renal abnormality after birth, with an estimated  pathology diagnosis of 11-15%.  Although this is unlikely, she was offered prenatal consultation with pediatric Nephrology to discuss plans for after delivery.  At minimum, the  should have repeat ultrasound after delivery to assess for hydronephrosis. She politely declines urology/nephrology consult.    24: Bilateral hydronephrosis measuring 1.6cm. She is aware of higher likelihood of needing follow up after delivery. She is aware underlying health concerns have not been effectively eliminated (reduced) as she has not had screening this pregnancy.

## 2024-05-16 NOTE — H&P
"Sonja Hoff is a 43 y.o.  at 30w5d was sent to the OB ED from her physician's office because of an elevated blood pressure reading there and they request to do additional evaluation.  Patient reports her blood pressure in the office was over 160 systolic and 100 diastolic.  She denies any symptoms such as headache, visual disturbances, abdominal pain or altered mental status.     This IUP is complicated by chronic hypertension with superimposed gestational hypertension..     Patient denies vaginal bleeding, leakage of fluid, contractions, headache, vision changes, RUQ pain, dysuria, fever, and nausea/vomiting.  Fetal Movement: normal.        BP (!) 174/107   Pulse 98   Ht 5' 4" (1.626 m)   Wt 68 kg (150 lb)   LMP 2023 (Exact Date)   BMI 25.75 kg/m²   Pulse:  [71] 71  BP: (154)/(107) 154/107     General: in no apparent distress  Cardiovascular: regular rate and rhythm no murmurs  Respiratory: clear to auscultation, no wheezes, rales or rhonchi, symmetric air entry  Abdominal: FHT present  Back: lumbar tenderness present CVA tenderness none  Extremeties no redness or tenderness in the calves or thighs     SSE:   SVE:  Deferred       FHT: Category 1  TOCO:  No contractions are noted     Medical Decision Making:  An IV access will be acquired and preeclampsia labs obtained.  Sequential blood pressures and continuous fetal monitoring.  Initiation of the labetalol extreme hypertension protocol.     LABS:     Recent Results         Recent Results (from the past 24 hour(s))   Comprehensive metabolic panel     Collection Time: 24  2:37 PM   Result Value Ref Range     Sodium Level 137 136 - 145 mmol/L     Potassium Level 4.1 3.5 - 5.1 mmol/L     Chloride 107 98 - 107 mmol/L     Carbon Dioxide 20 (L) 22 - 29 mmol/L     Glucose Level 99 74 - 100 mg/dL     Blood Urea Nitrogen 9.9 7.0 - 18.7 mg/dL     Creatinine 0.53 (L) 0.55 - 1.02 mg/dL     Calcium Level Total 8.9 8.4 - 10.2 mg/dL     Protein " Total 6.5 6.4 - 8.3 gm/dL     Albumin Level 3.4 (L) 3.5 - 5.0 g/dL     Globulin 3.1 2.4 - 3.5 gm/dL     Albumin/Globulin Ratio 1.1 1.1 - 2.0 ratio     Bilirubin Total 0.5 <=1.5 mg/dL     Alkaline Phosphatase 110 40 - 150 unit/L     Alanine Aminotransferase 22 0 - 55 unit/L     Aspartate Aminotransferase 21 5 - 34 unit/L     eGFR >60 mls/min/1.73/m2   Uric acid     Collection Time: 24  2:37 PM   Result Value Ref Range     Uric Acid 3.6 2.6 - 6.0 mg/dL   Lactate Dehydrogenase     Collection Time: 24  2:37 PM   Result Value Ref Range     Lactate Dehydrogenase 222 (H) 125 - 220 U/L   CBC with Differential     Collection Time: 24  2:37 PM   Result Value Ref Range     WBC 13.38 (H) 4.50 - 11.50 x10(3)/mcL     RBC 4.32 4.20 - 5.40 x10(6)/mcL     Hgb 13.9 12.0 - 16.0 g/dL     Hct 38.8 37.0 - 47.0 %     MCV 89.8 80.0 - 94.0 fL     MCH 32.2 (H) 27.0 - 31.0 pg     MCHC 35.8 33.0 - 36.0 g/dL     RDW 13.2 11.5 - 17.0 %     Platelet 275 130 - 400 x10(3)/mcL     MPV 9.6 7.4 - 10.4 fL     Neut % 76.0 %     Lymph % 12.5 %     Mono % 8.5 %     Eos % 1.1 %     Basophil % 0.4 %     Lymph # 1.67 0.6 - 4.6 x10(3)/mcL     Neut # 10.17 (H) 2.1 - 9.2 x10(3)/mcL     Mono # 1.14 0.1 - 1.3 x10(3)/mcL     Eos # 0.15 0 - 0.9 x10(3)/mcL     Baso # 0.05 <=0.2 x10(3)/mcL     IG# 0.20 (H) 0 - 0.04 x10(3)/mcL     IG% 1.5 %     NRBC% 0.0 %         [unfilled]      Imaging Results    None            ASSESMENT: Sonja Hoff is a 43 y.o.   at 30w5d with chronic hypertension with superimposed gestational hypertension.  Discussed patient with her primary OB who will place her on observation.  Requested Maternal-Fetal medicine consultation.  Biophysical profile requested.     Discharge Diagnosis/Clinical Impression:  Pregnancy 30 weeks.  Chronic hypertension.  Superimposed gestational hypertension.     Status:Stable     Disposition:  admitted to primary OB service for inpatient evaluation and MFM consult with   Vignes.

## 2024-05-20 ENCOUNTER — OFFICE VISIT (OUTPATIENT)
Dept: MATERNAL FETAL MEDICINE | Facility: CLINIC | Age: 44
End: 2024-05-20
Payer: MEDICAID

## 2024-05-20 ENCOUNTER — PROCEDURE VISIT (OUTPATIENT)
Dept: MATERNAL FETAL MEDICINE | Facility: CLINIC | Age: 44
End: 2024-05-20
Payer: MEDICAID

## 2024-05-20 VITALS
HEIGHT: 64 IN | WEIGHT: 158.06 LBS | HEART RATE: 76 BPM | SYSTOLIC BLOOD PRESSURE: 132 MMHG | DIASTOLIC BLOOD PRESSURE: 79 MMHG | BODY MASS INDEX: 26.98 KG/M2

## 2024-05-20 DIAGNOSIS — O26.23 RECURRENT PREGNANCY LOSS IN PREGNANT PATIENT IN THIRD TRIMESTER, ANTEPARTUM: ICD-10-CM

## 2024-05-20 DIAGNOSIS — O10.919 CHRONIC HYPERTENSION AFFECTING PREGNANCY: Primary | ICD-10-CM

## 2024-05-20 DIAGNOSIS — O35.EXX0 FETAL HYDRONEPHROSIS DURING PREGNANCY, ANTEPARTUM, SINGLE OR UNSPECIFIED FETUS: ICD-10-CM

## 2024-05-20 DIAGNOSIS — O09.523 MULTIGRAVIDA OF ADVANCED MATERNAL AGE IN THIRD TRIMESTER: ICD-10-CM

## 2024-05-20 DIAGNOSIS — O10.919 CHRONIC HYPERTENSION AFFECTING PREGNANCY: ICD-10-CM

## 2024-05-20 PROCEDURE — 3078F DIAST BP <80 MM HG: CPT | Mod: CPTII,S$GLB,, | Performed by: OBSTETRICS & GYNECOLOGY

## 2024-05-20 PROCEDURE — 3008F BODY MASS INDEX DOCD: CPT | Mod: CPTII,S$GLB,, | Performed by: OBSTETRICS & GYNECOLOGY

## 2024-05-20 PROCEDURE — 76816 OB US FOLLOW-UP PER FETUS: CPT | Mod: S$GLB,,, | Performed by: OBSTETRICS & GYNECOLOGY

## 2024-05-20 PROCEDURE — 3075F SYST BP GE 130 - 139MM HG: CPT | Mod: CPTII,S$GLB,, | Performed by: OBSTETRICS & GYNECOLOGY

## 2024-05-20 PROCEDURE — 1160F RVW MEDS BY RX/DR IN RCRD: CPT | Mod: CPTII,S$GLB,, | Performed by: OBSTETRICS & GYNECOLOGY

## 2024-05-20 PROCEDURE — 1159F MED LIST DOCD IN RCRD: CPT | Mod: CPTII,S$GLB,, | Performed by: OBSTETRICS & GYNECOLOGY

## 2024-05-20 PROCEDURE — 99214 OFFICE O/P EST MOD 30 MIN: CPT | Mod: TH,S$GLB,, | Performed by: OBSTETRICS & GYNECOLOGY

## 2024-05-20 PROCEDURE — 76819 FETAL BIOPHYS PROFIL W/O NST: CPT | Mod: S$GLB,,, | Performed by: OBSTETRICS & GYNECOLOGY

## 2024-05-20 NOTE — ASSESSMENT & PLAN NOTE
Right sided fetal hydronephrosis measuring 11.6mm was noted consistent with UTDA2. The kidneys appear normal. The bladder and ureter appears enlarged.  Amniotic fluid volume is normal.  She has not undergone any genetic screening for this pregnancy.     The finding of renal pelvis dilation was discussed with the patient. We reviewed basic anatomy of the renal collecting system and then discussed possible etiologies for renal pelvis dilation/ hydronephrosis. When dilation of the renal pelvis is borderline or mild, most cases will resolve spontaneously. The greater the amount of dilation/hydronephrosis, the more likely the post- abnormality. If the renal dilation persists in the  period, the most common conditions that can cause renal pelvis dilation are UPJ obstruction (ureteropelvic junction), UVJ obstruction (ureterovesical junction) and VUR (vesicoureteral reflux). These conditions predispose infants/children to urinary tract infection, but can be effectively followed and treated. I explained that the vast majority of infants with these findings will have spontaneous resolution during pregnancy. We will plan to re-evaluate the kidneys at her next office visit in 4 weeks.     Given these findings, underlying genetic conditions that can be associated with renal abnormalities were discussed.  Down syndrome can be associated with renal pyelectasis during pregnancy, therefore we discussed screening for this condition.  NIPT testing was offered and discussed, including risks and benefits of testing.  I discussed with the couple that I do not see any other findings on the ultrasound suggestive of Down syndrome, however some features can be subtle and missed during the prenatal period.  She also would be a candidate for amniocentesis if she desired, as any patient can elect to undergo definitive testing in pregnancy.  She politely declines biochemical screening.    The current findings are unlikely to result  in significant renal abnormality after birth, with an estimated  pathology diagnosis of 11-15%.  Although this is unlikely, she was offered prenatal consultation with pediatric Nephrology to discuss plans for after delivery.  At minimum, the  should have repeat ultrasound after delivery to assess for hydronephrosis. She politely declines urology/nephrology consult.    24: Bilateral hydronephrosis measuring 1.6cm. She is aware of higher likelihood of needing follow up after delivery. She is aware underlying health concerns have not been effectively eliminated (reduced) as she has not had screening this pregnancy.     24: Bilateral hydronephrosis with dilated ureters noted, again.

## 2024-05-20 NOTE — PROGRESS NOTES
"Maternal Fetal Medicine follow up consult    SUBJECTIVE:     Sonja Hoff is a 43 y.o.  female with IUP at 33w4d who is seen in follow up consultation by MFM.  Pregnancy complications include:   Problem   - Fetal hydronephrosis during pregnancy, antepartum   - Chronic hypertension affecting pregnancy     Sonja presents for routine follow up appointment.  She went to her primary OB's office on 5/15/24. Her Bp was 143/88 at that visit. She continued to check her Bps at home. We spoke with her on 24 and increased Labetalol to 400mg TID due to home BP log being slightly elevated. Her Bps have been normal since then, however, she has complaints of feeling "woozy" all the time now.   Denies LOF, VB, contractions. Positive fetal movement.    Previous notes reviewed.   No changes to medical, surgical, family, social, or obstetric history.    Interval history since last MFM visit: see above    Medications reviewed.    Care team members:  Dr Coreas - Primary OB     OBJECTIVE:   /79 (BP Location: Right arm)   Pulse 76   Ht 5' 4" (1.626 m)   Wt 71.7 kg (158 lb 1.1 oz)   LMP 2023 (Exact Date)   BMI 27.13 kg/m²     Ultrasound performed. See viewpoint for full ultrasound report.    A viable pruett pregnancy is visualized in cephalic presentation.  Estimated fetal weight is at the 22nd percentile with an abdominal circumference at the 35th percentile.    Fetal hydronephrosis present bilaterally. Amniotic fluid volume is normal.  Placenta is posterior. Biophysical profile is 8/8.     ASSESSMENT/PLAN:     43 y.o.  female with IUP at 33w4d    - Chronic hypertension affecting pregnancy  Previously counseled the patient on maternal/fetal risks associated with CHTN during pregnancy. Risks include but not limited to fetal growth restriction, miscarriage, abruption, maternal end organ disease (renal failure, MI, and stroke),  delivery, development of superimposed preeclampsia, and " "eclampsia.     5/6/24- Currently on Hydralazine 50mg QID, Nifedipine 90mg BID, and Labetalol 200 TID. Doing well without complaints. /75  5/13/24- /84  5/16/24- Increased to Labetalol 400mg TID  5/20/24- /79. She has complaints of feeling "woozy" throughout the day since medication increase. We will attempt decreasing dose to 300mg TID. She will keep a home BP log to monitor closely and submit to our office.    Recommendations ([facility]  -Initiate aspirin 81 mg daily at 12-16 weeks gestation for preeclampsia risk reduction  - Current regimen: labetalol 200mg TID, nifedipine 90 XL BID, Hydralazine 50mg QID  -Baseline evaluation with primary OB:   24-hour urine protein or baseline P/C ratio, CMP, and CBC -  done  Maternal EKG  Maternal ophthalmic evaluation  Maternal echocardiogram if HTN has been long-standing or EKG is abnormal  -Serial fetal growth ultrasounds every 4-6 weeks, beginning at 26-28 weeks.   -Continued close observation of patient's blood pressures. Avoid hypotension as this has been associated with uteroplacental insufficiency.  -In conjunction with the CHAP study recommendation for BP control: If BP is persistently ?140/90 antihypertensive medication is recommended with goal BP of 120-140/80-90.  -Weekly antepartum testing at 32 weeks (NST+AFV); twice weekly testing if control is poor, multiple comorbidities are present, or requires several medications for control   -Delivery timing:  No medications, no comorbid conditions: 39 0/7 - 39 6/7 weeks gestation  No medications, comorbid conditions: 38 0/7 - 38 6/7 weeks gestation  Controlled on single agent, no comorbid conditions: 38 0/7 - 38 6/7 weeks gestation  Controlled on single agent, comorbid conditions: 37 0/7 - 38 6/7 weeks gestation  Uncontrolled or requiring ? 2 medications: 36 weeks at latest advised    Comorbid conditions include BMI >= 40, diabetes, and complex medical condition associated " with placental dysfunction (ie lupus or other vascular disease)  Delivery may be recommended earlier pending results of fetal growth ultrasounds, AFV assessment, or antepartum testing results.        - Fetal hydronephrosis during pregnancy, antepartum  Right sided fetal hydronephrosis measuring 11.6mm was noted consistent with UTDA2. The kidneys appear normal. The bladder and ureter appears enlarged.  Amniotic fluid volume is normal.  She has not undergone any genetic screening for this pregnancy.     The finding of renal pelvis dilation was discussed with the patient. We reviewed basic anatomy of the renal collecting system and then discussed possible etiologies for renal pelvis dilation/ hydronephrosis. When dilation of the renal pelvis is borderline or mild, most cases will resolve spontaneously. The greater the amount of dilation/hydronephrosis, the more likely the post- abnormality. If the renal dilation persists in the  period, the most common conditions that can cause renal pelvis dilation are UPJ obstruction (ureteropelvic junction), UVJ obstruction (ureterovesical junction) and VUR (vesicoureteral reflux). These conditions predispose infants/children to urinary tract infection, but can be effectively followed and treated. I explained that the vast majority of infants with these findings will have spontaneous resolution during pregnancy. We will plan to re-evaluate the kidneys at her next office visit in 4 weeks.     Given these findings, underlying genetic conditions that can be associated with renal abnormalities were discussed.  Down syndrome can be associated with renal pyelectasis during pregnancy, therefore we discussed screening for this condition.  NIPT testing was offered and discussed, including risks and benefits of testing.  I discussed with the couple that I do not see any other findings on the ultrasound suggestive of Down syndrome, however some features can be subtle and missed  during the prenatal period.  She also would be a candidate for amniocentesis if she desired, as any patient can elect to undergo definitive testing in pregnancy.  She politely declines biochemical screening.    The current findings are unlikely to result in significant renal abnormality after birth, with an estimated  pathology diagnosis of 11-15%.  Although this is unlikely, she was offered prenatal consultation with pediatric Nephrology to discuss plans for after delivery.  At minimum, the  should have repeat ultrasound after delivery to assess for hydronephrosis. She politely declines urology/nephrology consult.    24: Bilateral hydronephrosis measuring 1.6cm. She is aware of higher likelihood of needing follow up after delivery. She is aware underlying health concerns have not been effectively eliminated (reduced) as she has not had screening this pregnancy.     24: Bilateral hydronephrosis with dilated ureters noted, again.       F/u for once weekly antepartum testing until delivery at 36w    Jaimee Hwang MD  Maternal Fetal Medicine

## 2024-05-20 NOTE — ASSESSMENT & PLAN NOTE
"Previously counseled the patient on maternal/fetal risks associated with CHTN during pregnancy. Risks include but not limited to fetal growth restriction, miscarriage, abruption, maternal end organ disease (renal failure, MI, and stroke),  delivery, development of superimposed preeclampsia, and eclampsia.     24- Currently on Hydralazine 50mg QID, Nifedipine 90mg BID, and Labetalol 200 TID. Doing well without complaints. /75  24- /84  24- Increased to Labetalol 400mg TID  24- /79. She has complaints of feeling "woozy" throughout the day since medication increase. We will attempt decreasing dose to 300mg TID. She will keep a home BP log to monitor closely and submit to our office.    Recommendations (Please refer to [facility] guidelines):  -Initiate aspirin 81 mg daily at 12-16 weeks gestation for preeclampsia risk reduction  - Current regimen: labetalol 200mg TID, nifedipine 90 XL BID, Hydralazine 50mg QID  -Baseline evaluation with primary OB:   24-hour urine protein or baseline P/C ratio, CMP, and CBC -  done  Maternal EKG  Maternal ophthalmic evaluation  Maternal echocardiogram if HTN has been long-standing or EKG is abnormal  -Serial fetal growth ultrasounds every 4-6 weeks, beginning at 26-28 weeks.   -Continued close observation of patient's blood pressures. Avoid hypotension as this has been associated with uteroplacental insufficiency.  -In conjunction with the CHAP study recommendation for BP control: If BP is persistently ?140/90 antihypertensive medication is recommended with goal BP of 120-140/80-90.  -Weekly antepartum testing at 32 weeks (NST+AFV); twice weekly testing if control is poor, multiple comorbidities are present, or requires several medications for control   -Delivery timing:  No medications, no comorbid conditions: 39 0/7 - 39 6/7 weeks gestation  No medications, comorbid conditions: 38 0/7 - 38 6/7 weeks gestation  Controlled on single agent, " no comorbid conditions: 38 0/7 - 38 6/7 weeks gestation  Controlled on single agent, comorbid conditions: 37 0/7 - 38 6/7 weeks gestation  Uncontrolled or requiring ? 2 medications: 36 weeks at latest advised    Comorbid conditions include BMI >= 40, diabetes, and complex medical condition associated with placental dysfunction (ie lupus or other vascular disease)  Delivery may be recommended earlier pending results of fetal growth ultrasounds, AFV assessment, or antepartum testing results.

## 2024-05-23 RX ORDER — PRENATAL WITH FERROUS FUM AND FOLIC ACID 3080; 920; 120; 400; 22; 1.84; 3; 20; 10; 1; 12; 200; 27; 25; 2 [IU]/1; [IU]/1; MG/1; [IU]/1; MG/1; MG/1; MG/1; MG/1; MG/1; MG/1; UG/1; MG/1; MG/1; MG/1; MG/1
1 TABLET ORAL DAILY
Qty: 30 TABLET | Refills: 11 | Status: SHIPPED | OUTPATIENT
Start: 2024-05-23 | End: 2025-05-23

## 2024-05-27 ENCOUNTER — PROCEDURE VISIT (OUTPATIENT)
Dept: MATERNAL FETAL MEDICINE | Facility: CLINIC | Age: 44
End: 2024-05-27
Payer: MEDICAID

## 2024-05-27 VITALS
SYSTOLIC BLOOD PRESSURE: 126 MMHG | HEART RATE: 84 BPM | BODY MASS INDEX: 27.03 KG/M2 | DIASTOLIC BLOOD PRESSURE: 74 MMHG | WEIGHT: 158.31 LBS | HEIGHT: 64 IN

## 2024-05-27 DIAGNOSIS — O26.23 RECURRENT PREGNANCY LOSS IN PREGNANT PATIENT IN THIRD TRIMESTER, ANTEPARTUM: ICD-10-CM

## 2024-05-27 DIAGNOSIS — O09.523 MULTIGRAVIDA OF ADVANCED MATERNAL AGE IN THIRD TRIMESTER: ICD-10-CM

## 2024-05-27 DIAGNOSIS — O10.919 CHRONIC HYPERTENSION AFFECTING PREGNANCY: ICD-10-CM

## 2024-05-27 PROCEDURE — 76819 FETAL BIOPHYS PROFIL W/O NST: CPT | Mod: S$GLB,,, | Performed by: OBSTETRICS & GYNECOLOGY

## 2024-05-29 PROCEDURE — 87081 CULTURE SCREEN ONLY: CPT | Performed by: PHYSICIAN ASSISTANT

## 2024-06-03 ENCOUNTER — PROCEDURE VISIT (OUTPATIENT)
Dept: MATERNAL FETAL MEDICINE | Facility: CLINIC | Age: 44
End: 2024-06-03
Payer: MEDICAID

## 2024-06-03 VITALS
HEART RATE: 80 BPM | BODY MASS INDEX: 27.48 KG/M2 | HEIGHT: 64 IN | DIASTOLIC BLOOD PRESSURE: 73 MMHG | SYSTOLIC BLOOD PRESSURE: 116 MMHG | WEIGHT: 160.94 LBS

## 2024-06-03 DIAGNOSIS — O09.523 MULTIGRAVIDA OF ADVANCED MATERNAL AGE IN THIRD TRIMESTER: ICD-10-CM

## 2024-06-03 DIAGNOSIS — O26.23 RECURRENT PREGNANCY LOSS IN PREGNANT PATIENT IN THIRD TRIMESTER, ANTEPARTUM: ICD-10-CM

## 2024-06-03 DIAGNOSIS — O10.919 CHRONIC HYPERTENSION AFFECTING PREGNANCY: ICD-10-CM

## 2024-06-03 PROCEDURE — 76819 FETAL BIOPHYS PROFIL W/O NST: CPT | Mod: S$GLB,,, | Performed by: OBSTETRICS & GYNECOLOGY

## 2024-06-06 ENCOUNTER — HOSPITAL ENCOUNTER (INPATIENT)
Facility: HOSPITAL | Age: 44
LOS: 4 days | Discharge: HOME | End: 2024-06-10
Attending: OBSTETRICS & GYNECOLOGY | Admitting: OBSTETRICS & GYNECOLOGY
Payer: MEDICAID

## 2024-06-06 ENCOUNTER — ANESTHESIA (OUTPATIENT)
Dept: OBSTETRICS AND GYNECOLOGY | Facility: HOSPITAL | Age: 44
End: 2024-06-06
Payer: MEDICAID

## 2024-06-06 ENCOUNTER — ANESTHESIA EVENT (OUTPATIENT)
Dept: OBSTETRICS AND GYNECOLOGY | Facility: HOSPITAL | Age: 44
End: 2024-06-06
Payer: MEDICAID

## 2024-06-06 VITALS — RESPIRATION RATE: 18 BRPM

## 2024-06-06 DIAGNOSIS — Z34.90 ENCOUNTER FOR INDUCTION OF LABOR: Primary | ICD-10-CM

## 2024-06-06 DIAGNOSIS — Z34.90 PREGNANCY: ICD-10-CM

## 2024-06-06 DIAGNOSIS — O36.8390 NON-REASSURING FETAL HEART TONES COMPLICATING PREGNANCY, ANTEPARTUM: ICD-10-CM

## 2024-06-06 LAB
BASOPHILS # BLD AUTO: 0.07 X10(3)/MCL
BASOPHILS NFR BLD AUTO: 0.6 %
EOSINOPHIL # BLD AUTO: 0.09 X10(3)/MCL (ref 0–0.9)
EOSINOPHIL NFR BLD AUTO: 0.7 %
ERYTHROCYTE [DISTWIDTH] IN BLOOD BY AUTOMATED COUNT: 12.6 % (ref 11.5–17)
GROUP & RH: NORMAL
HBV SURFACE AG SERPL QL IA: NONREACTIVE
HCT VFR BLD AUTO: 36.6 % (ref 37–47)
HGB BLD-MCNC: 13.1 G/DL (ref 12–16)
HIV 1+2 AB+HIV1 P24 AG SERPL QL IA: NONREACTIVE
IMM GRANULOCYTES # BLD AUTO: 0.17 X10(3)/MCL (ref 0–0.04)
IMM GRANULOCYTES NFR BLD AUTO: 1.4 %
INDIRECT COOMBS: NORMAL
LYMPHOCYTES # BLD AUTO: 1.7 X10(3)/MCL (ref 0.6–4.6)
LYMPHOCYTES NFR BLD AUTO: 13.9 %
MCH RBC QN AUTO: 31.9 PG (ref 27–31)
MCHC RBC AUTO-ENTMCNC: 35.8 G/DL (ref 33–36)
MCV RBC AUTO: 89.1 FL (ref 80–94)
MONOCYTES # BLD AUTO: 1.15 X10(3)/MCL (ref 0.1–1.3)
MONOCYTES NFR BLD AUTO: 9.4 %
NEUTROPHILS # BLD AUTO: 9.02 X10(3)/MCL (ref 2.1–9.2)
NEUTROPHILS NFR BLD AUTO: 74 %
NRBC BLD AUTO-RTO: 0 %
PLATELET # BLD AUTO: 256 X10(3)/MCL (ref 130–400)
PMV BLD AUTO: 10.2 FL (ref 7.4–10.4)
RBC # BLD AUTO: 4.11 X10(6)/MCL (ref 4.2–5.4)
SPECIMEN OUTDATE: NORMAL
T PALLIDUM AB SER QL: NONREACTIVE
WBC # SPEC AUTO: 12.2 X10(3)/MCL (ref 4.5–11.5)

## 2024-06-06 PROCEDURE — 3E033VJ INTRODUCTION OF OTHER HORMONE INTO PERIPHERAL VEIN, PERCUTANEOUS APPROACH: ICD-10-PCS | Performed by: OBSTETRICS & GYNECOLOGY

## 2024-06-06 PROCEDURE — 10907ZC DRAINAGE OF AMNIOTIC FLUID, THERAPEUTIC FROM PRODUCTS OF CONCEPTION, VIA NATURAL OR ARTIFICIAL OPENING: ICD-10-PCS | Performed by: OBSTETRICS & GYNECOLOGY

## 2024-06-06 PROCEDURE — 63600175 PHARM REV CODE 636 W HCPCS: Performed by: OBSTETRICS & GYNECOLOGY

## 2024-06-06 PROCEDURE — 62326 NJX INTERLAMINAR LMBR/SAC: CPT | Performed by: ANESTHESIOLOGY

## 2024-06-06 PROCEDURE — 87389 HIV-1 AG W/HIV-1&-2 AB AG IA: CPT | Performed by: OBSTETRICS & GYNECOLOGY

## 2024-06-06 PROCEDURE — 85025 COMPLETE CBC W/AUTO DIFF WBC: CPT | Performed by: OBSTETRICS & GYNECOLOGY

## 2024-06-06 PROCEDURE — 71000033 HC RECOVERY, INTIAL HOUR: Performed by: OBSTETRICS & GYNECOLOGY

## 2024-06-06 PROCEDURE — 36004725 HC OB OR TIME LEV III - EA ADD 15 MIN: Performed by: OBSTETRICS & GYNECOLOGY

## 2024-06-06 PROCEDURE — 63600175 PHARM REV CODE 636 W HCPCS: Mod: JZ,JG | Performed by: OBSTETRICS & GYNECOLOGY

## 2024-06-06 PROCEDURE — 25000003 PHARM REV CODE 250: Performed by: ANESTHESIOLOGY

## 2024-06-06 PROCEDURE — 86900 BLOOD TYPING SEROLOGIC ABO: CPT | Performed by: OBSTETRICS & GYNECOLOGY

## 2024-06-06 PROCEDURE — 36415 COLL VENOUS BLD VENIPUNCTURE: CPT | Performed by: OBSTETRICS & GYNECOLOGY

## 2024-06-06 PROCEDURE — 25000003 PHARM REV CODE 250: Performed by: OBSTETRICS & GYNECOLOGY

## 2024-06-06 PROCEDURE — 72100003 HC LABOR CARE, EA. ADDL. 8 HRS

## 2024-06-06 PROCEDURE — 27000492 HC SLEEVE, SCD T/L

## 2024-06-06 PROCEDURE — 11000001 HC ACUTE MED/SURG PRIVATE ROOM

## 2024-06-06 PROCEDURE — 51702 INSERT TEMP BLADDER CATH: CPT

## 2024-06-06 PROCEDURE — 37000008 HC ANESTHESIA 1ST 15 MINUTES: Performed by: OBSTETRICS & GYNECOLOGY

## 2024-06-06 PROCEDURE — 87340 HEPATITIS B SURFACE AG IA: CPT | Performed by: OBSTETRICS & GYNECOLOGY

## 2024-06-06 PROCEDURE — 63600175 PHARM REV CODE 636 W HCPCS

## 2024-06-06 PROCEDURE — 71000039 HC RECOVERY, EACH ADD'L HOUR: Performed by: OBSTETRICS & GYNECOLOGY

## 2024-06-06 PROCEDURE — 27201423 OPTIME MED/SURG SUP & DEVICES STERILE SUPPLY: Performed by: OBSTETRICS & GYNECOLOGY

## 2024-06-06 PROCEDURE — 37000009 HC ANESTHESIA EA ADD 15 MINS: Performed by: OBSTETRICS & GYNECOLOGY

## 2024-06-06 PROCEDURE — 01968 ANES/ANALG CS DLVR NEURAXIAL: CPT | Mod: QY,ANES,, | Performed by: ANESTHESIOLOGY

## 2024-06-06 PROCEDURE — 72100002 HC LABOR CARE, 1ST 8 HOURS

## 2024-06-06 PROCEDURE — 27201108 HC SURGICEL

## 2024-06-06 PROCEDURE — 27200918 HC ALEXIS O RING

## 2024-06-06 PROCEDURE — 59514 CESAREAN DELIVERY ONLY: CPT | Mod: QX,CRNA,,

## 2024-06-06 PROCEDURE — 86901 BLOOD TYPING SEROLOGIC RH(D): CPT | Performed by: OBSTETRICS & GYNECOLOGY

## 2024-06-06 PROCEDURE — 86780 TREPONEMA PALLIDUM: CPT | Performed by: OBSTETRICS & GYNECOLOGY

## 2024-06-06 PROCEDURE — 36004724 HC OB OR TIME LEV III - 1ST 15 MIN: Performed by: OBSTETRICS & GYNECOLOGY

## 2024-06-06 PROCEDURE — 01968 ANES/ANALG CS DLVR NEURAXIAL: CPT | Mod: QX,CRNA,,

## 2024-06-06 PROCEDURE — 59514 CESAREAN DELIVERY ONLY: CPT | Mod: AA,ANES,, | Performed by: ANESTHESIOLOGY

## 2024-06-06 PROCEDURE — 25000003 PHARM REV CODE 250

## 2024-06-06 RX ORDER — CEFAZOLIN SODIUM 1 G/3ML
INJECTION, POWDER, FOR SOLUTION INTRAMUSCULAR; INTRAVENOUS
Status: DISCONTINUED | OUTPATIENT
Start: 2024-06-06 | End: 2024-06-06

## 2024-06-06 RX ORDER — ALUMINUM HYDROXIDE, MAGNESIUM HYDROXIDE, AND SIMETHICONE 1200; 120; 1200 MG/30ML; MG/30ML; MG/30ML
30 SUSPENSION ORAL ONCE
Status: COMPLETED | OUTPATIENT
Start: 2024-06-06 | End: 2024-06-06

## 2024-06-06 RX ORDER — LABETALOL HCL 20 MG/4 ML
80 SYRINGE (ML) INTRAVENOUS ONCE AS NEEDED
Status: DISCONTINUED | OUTPATIENT
Start: 2024-06-06 | End: 2024-06-10 | Stop reason: HOSPADM

## 2024-06-06 RX ORDER — MISOPROSTOL 100 UG/1
800 TABLET ORAL
Status: DISCONTINUED | OUTPATIENT
Start: 2024-06-06 | End: 2024-06-10 | Stop reason: HOSPADM

## 2024-06-06 RX ORDER — LABETALOL HCL 20 MG/4 ML
40 SYRINGE (ML) INTRAVENOUS ONCE AS NEEDED
Status: COMPLETED | OUTPATIENT
Start: 2024-06-06 | End: 2024-06-06

## 2024-06-06 RX ORDER — METHYLERGONOVINE MALEATE 0.2 MG/ML
200 INJECTION INTRAVENOUS
Status: DISCONTINUED | OUTPATIENT
Start: 2024-06-06 | End: 2024-06-10 | Stop reason: HOSPADM

## 2024-06-06 RX ORDER — FAMOTIDINE 10 MG/ML
20 INJECTION INTRAVENOUS 2 TIMES DAILY PRN
Status: DISCONTINUED | OUTPATIENT
Start: 2024-06-06 | End: 2024-06-10 | Stop reason: HOSPADM

## 2024-06-06 RX ORDER — CARBOPROST TROMETHAMINE 250 UG/ML
250 INJECTION, SOLUTION INTRAMUSCULAR
Status: DISCONTINUED | OUTPATIENT
Start: 2024-06-06 | End: 2024-06-10 | Stop reason: HOSPADM

## 2024-06-06 RX ORDER — BUTORPHANOL TARTRATE 2 MG/ML
2 INJECTION INTRAMUSCULAR; INTRAVENOUS
Status: DISCONTINUED | OUTPATIENT
Start: 2024-06-06 | End: 2024-06-10 | Stop reason: HOSPADM

## 2024-06-06 RX ORDER — SERTRALINE HYDROCHLORIDE 50 MG/1
50 TABLET, FILM COATED ORAL DAILY
Status: DISCONTINUED | OUTPATIENT
Start: 2024-06-06 | End: 2024-06-10 | Stop reason: HOSPADM

## 2024-06-06 RX ORDER — SIMETHICONE 80 MG
1 TABLET,CHEWABLE ORAL 4 TIMES DAILY PRN
Status: DISCONTINUED | OUTPATIENT
Start: 2024-06-06 | End: 2024-06-10 | Stop reason: HOSPADM

## 2024-06-06 RX ORDER — CALCIUM CARBONATE 200(500)MG
500 TABLET,CHEWABLE ORAL 3 TIMES DAILY PRN
Status: DISCONTINUED | OUTPATIENT
Start: 2024-06-06 | End: 2024-06-10 | Stop reason: HOSPADM

## 2024-06-06 RX ORDER — FENTANYL/BUPIVACAINE/NS/PF 2-1250MCG
PLASTIC BAG, INJECTION (ML) INJECTION CONTINUOUS
Status: DISCONTINUED | OUTPATIENT
Start: 2024-06-06 | End: 2024-06-10 | Stop reason: HOSPADM

## 2024-06-06 RX ORDER — OXYTOCIN/RINGER'S LACTATE 30/500 ML
334 PLASTIC BAG, INJECTION (ML) INTRAVENOUS ONCE AS NEEDED
Status: DISCONTINUED | OUTPATIENT
Start: 2024-06-06 | End: 2024-06-10 | Stop reason: HOSPADM

## 2024-06-06 RX ORDER — FENTANYL CITRATE 50 UG/ML
INJECTION, SOLUTION INTRAMUSCULAR; INTRAVENOUS
Status: DISCONTINUED | OUTPATIENT
Start: 2024-06-06 | End: 2024-06-06

## 2024-06-06 RX ORDER — SERTRALINE HYDROCHLORIDE 50 MG/1
50 TABLET, FILM COATED ORAL DAILY
Status: DISCONTINUED | OUTPATIENT
Start: 2024-06-07 | End: 2024-06-06

## 2024-06-06 RX ORDER — KETOROLAC TROMETHAMINE 30 MG/ML
INJECTION, SOLUTION INTRAMUSCULAR; INTRAVENOUS
Status: DISCONTINUED | OUTPATIENT
Start: 2024-06-06 | End: 2024-06-06

## 2024-06-06 RX ORDER — SODIUM CHLORIDE, SODIUM LACTATE, POTASSIUM CHLORIDE, CALCIUM CHLORIDE 600; 310; 30; 20 MG/100ML; MG/100ML; MG/100ML; MG/100ML
INJECTION, SOLUTION INTRAVENOUS CONTINUOUS
Status: DISCONTINUED | OUTPATIENT
Start: 2024-06-06 | End: 2024-06-10 | Stop reason: HOSPADM

## 2024-06-06 RX ORDER — METHYLERGONOVINE MALEATE 0.2 MG/ML
200 INJECTION INTRAVENOUS ONCE AS NEEDED
Status: DISCONTINUED | OUTPATIENT
Start: 2024-06-06 | End: 2024-06-10 | Stop reason: HOSPADM

## 2024-06-06 RX ORDER — CEFAZOLIN SODIUM 2 G/50ML
2 SOLUTION INTRAVENOUS
Status: DISCONTINUED | OUTPATIENT
Start: 2024-06-06 | End: 2024-06-10 | Stop reason: HOSPADM

## 2024-06-06 RX ORDER — OXYTOCIN/RINGER'S LACTATE 30/500 ML
95 PLASTIC BAG, INJECTION (ML) INTRAVENOUS ONCE
Status: DISCONTINUED | OUTPATIENT
Start: 2024-06-06 | End: 2024-06-10 | Stop reason: HOSPADM

## 2024-06-06 RX ORDER — OXYTOCIN/RINGER'S LACTATE 30/500 ML
334 PLASTIC BAG, INJECTION (ML) INTRAVENOUS ONCE
Status: DISCONTINUED | OUTPATIENT
Start: 2024-06-06 | End: 2024-06-10 | Stop reason: HOSPADM

## 2024-06-06 RX ORDER — ACETAMINOPHEN 325 MG/1
650 TABLET ORAL EVERY 6 HOURS PRN
Status: DISCONTINUED | OUTPATIENT
Start: 2024-06-06 | End: 2024-06-07

## 2024-06-06 RX ORDER — MORPHINE SULFATE 4 MG/ML
4 INJECTION, SOLUTION INTRAMUSCULAR; INTRAVENOUS EVERY 4 HOURS PRN
Status: DISCONTINUED | OUTPATIENT
Start: 2024-06-06 | End: 2024-06-10 | Stop reason: HOSPADM

## 2024-06-06 RX ORDER — TRANEXAMIC ACID 100 MG/ML
INJECTION, SOLUTION INTRAVENOUS
Status: DISCONTINUED | OUTPATIENT
Start: 2024-06-06 | End: 2024-06-06

## 2024-06-06 RX ORDER — HYDRALAZINE HYDROCHLORIDE 25 MG/1
50 TABLET, FILM COATED ORAL EVERY 6 HOURS
Status: DISCONTINUED | OUTPATIENT
Start: 2024-06-06 | End: 2024-06-08

## 2024-06-06 RX ORDER — BUTORPHANOL TARTRATE 2 MG/ML
1 INJECTION INTRAMUSCULAR; INTRAVENOUS
Status: DISCONTINUED | OUTPATIENT
Start: 2024-06-06 | End: 2024-06-10 | Stop reason: HOSPADM

## 2024-06-06 RX ORDER — SODIUM CITRATE AND CITRIC ACID MONOHYDRATE 334; 500 MG/5ML; MG/5ML
30 SOLUTION ORAL ONCE
Status: COMPLETED | OUTPATIENT
Start: 2024-06-06 | End: 2024-06-06

## 2024-06-06 RX ORDER — ONDANSETRON 4 MG/1
8 TABLET, ORALLY DISINTEGRATING ORAL EVERY 8 HOURS PRN
Status: DISCONTINUED | OUTPATIENT
Start: 2024-06-06 | End: 2024-06-07

## 2024-06-06 RX ORDER — HYDRALAZINE HYDROCHLORIDE 20 MG/ML
10 INJECTION INTRAMUSCULAR; INTRAVENOUS ONCE AS NEEDED
Status: DISCONTINUED | OUTPATIENT
Start: 2024-06-06 | End: 2024-06-06

## 2024-06-06 RX ORDER — LABETALOL HCL 20 MG/4 ML
20 SYRINGE (ML) INTRAVENOUS ONCE AS NEEDED
Status: DISCONTINUED | OUTPATIENT
Start: 2024-06-06 | End: 2024-06-10 | Stop reason: HOSPADM

## 2024-06-06 RX ORDER — OXYTOCIN/RINGER'S LACTATE 30/500 ML
0-30 PLASTIC BAG, INJECTION (ML) INTRAVENOUS CONTINUOUS
Status: DISCONTINUED | OUTPATIENT
Start: 2024-06-06 | End: 2024-06-10 | Stop reason: HOSPADM

## 2024-06-06 RX ORDER — LABETALOL HCL 20 MG/4 ML
20 SYRINGE (ML) INTRAVENOUS ONCE AS NEEDED
Status: COMPLETED | OUTPATIENT
Start: 2024-06-06 | End: 2024-06-06

## 2024-06-06 RX ORDER — DEXTROSE, SODIUM CHLORIDE, SODIUM LACTATE, POTASSIUM CHLORIDE, AND CALCIUM CHLORIDE 5; .6; .31; .03; .02 G/100ML; G/100ML; G/100ML; G/100ML; G/100ML
INJECTION, SOLUTION INTRAVENOUS CONTINUOUS
Status: DISCONTINUED | OUTPATIENT
Start: 2024-06-06 | End: 2024-06-10 | Stop reason: HOSPADM

## 2024-06-06 RX ORDER — EPHEDRINE SULFATE 50 MG/ML
10 INJECTION, SOLUTION INTRAVENOUS
Status: ACTIVE | OUTPATIENT
Start: 2024-06-06 | End: 2024-06-06

## 2024-06-06 RX ORDER — OXYTOCIN 10 [USP'U]/ML
10 INJECTION, SOLUTION INTRAMUSCULAR; INTRAVENOUS ONCE AS NEEDED
Status: DISCONTINUED | OUTPATIENT
Start: 2024-06-06 | End: 2024-06-10 | Stop reason: HOSPADM

## 2024-06-06 RX ORDER — LIDOCAINE HYDROCHLORIDE 20 MG/ML
INJECTION, SOLUTION EPIDURAL; INFILTRATION; INTRACAUDAL; PERINEURAL
Status: DISCONTINUED | OUTPATIENT
Start: 2024-06-06 | End: 2024-06-06

## 2024-06-06 RX ORDER — OXYTOCIN/RINGER'S LACTATE 30/500 ML
334 PLASTIC BAG, INJECTION (ML) INTRAVENOUS ONCE
Status: COMPLETED | OUTPATIENT
Start: 2024-06-06 | End: 2024-06-06

## 2024-06-06 RX ORDER — OXYTOCIN/RINGER'S LACTATE 30/500 ML
95 PLASTIC BAG, INJECTION (ML) INTRAVENOUS ONCE
Status: COMPLETED | OUTPATIENT
Start: 2024-06-06 | End: 2024-06-06

## 2024-06-06 RX ORDER — MISOPROSTOL 100 UG/1
800 TABLET ORAL ONCE AS NEEDED
Status: DISCONTINUED | OUTPATIENT
Start: 2024-06-06 | End: 2024-06-10 | Stop reason: HOSPADM

## 2024-06-06 RX ORDER — DIPHENOXYLATE HYDROCHLORIDE AND ATROPINE SULFATE 2.5; .025 MG/1; MG/1
2 TABLET ORAL EVERY 6 HOURS PRN
Status: DISCONTINUED | OUTPATIENT
Start: 2024-06-06 | End: 2024-06-10 | Stop reason: HOSPADM

## 2024-06-06 RX ORDER — SODIUM CITRATE AND CITRIC ACID MONOHYDRATE 334; 500 MG/5ML; MG/5ML
30 SOLUTION ORAL
Status: DISCONTINUED | OUTPATIENT
Start: 2024-06-06 | End: 2024-06-10 | Stop reason: HOSPADM

## 2024-06-06 RX ORDER — FAMOTIDINE 10 MG/ML
20 INJECTION INTRAVENOUS 2 TIMES DAILY
Status: DISCONTINUED | OUTPATIENT
Start: 2024-06-06 | End: 2024-06-06

## 2024-06-06 RX ORDER — LIDOCAINE HYDROCHLORIDE 10 MG/ML
10 INJECTION INFILTRATION; PERINEURAL ONCE AS NEEDED
Status: DISCONTINUED | OUTPATIENT
Start: 2024-06-06 | End: 2024-06-10 | Stop reason: HOSPADM

## 2024-06-06 RX ORDER — ONDANSETRON HYDROCHLORIDE 2 MG/ML
INJECTION, SOLUTION INTRAVENOUS
Status: DISCONTINUED | OUTPATIENT
Start: 2024-06-06 | End: 2024-06-06

## 2024-06-06 RX ORDER — MUPIROCIN 20 MG/G
OINTMENT TOPICAL
Status: DISCONTINUED | OUTPATIENT
Start: 2024-06-06 | End: 2024-06-10 | Stop reason: HOSPADM

## 2024-06-06 RX ORDER — BUPIVACAINE HYDROCHLORIDE 5 MG/ML
INJECTION, SOLUTION EPIDURAL; INTRACAUDAL
Status: DISCONTINUED | OUTPATIENT
Start: 2024-06-06 | End: 2024-06-06

## 2024-06-06 RX ORDER — HYDRALAZINE HYDROCHLORIDE 20 MG/ML
10 INJECTION INTRAMUSCULAR; INTRAVENOUS ONCE AS NEEDED
Status: DISCONTINUED | OUTPATIENT
Start: 2024-06-06 | End: 2024-06-10 | Stop reason: HOSPADM

## 2024-06-06 RX ORDER — NIFEDIPINE 90 MG/1
90 TABLET, EXTENDED RELEASE ORAL 2 TIMES DAILY
Status: DISCONTINUED | OUTPATIENT
Start: 2024-06-06 | End: 2024-06-08

## 2024-06-06 RX ORDER — OXYTOCIN/RINGER'S LACTATE 30/500 ML
95 PLASTIC BAG, INJECTION (ML) INTRAVENOUS ONCE AS NEEDED
Status: DISCONTINUED | OUTPATIENT
Start: 2024-06-06 | End: 2024-06-10 | Stop reason: HOSPADM

## 2024-06-06 RX ORDER — LABETALOL HCL 20 MG/4 ML
40 SYRINGE (ML) INTRAVENOUS ONCE AS NEEDED
Status: DISCONTINUED | OUTPATIENT
Start: 2024-06-06 | End: 2024-06-10 | Stop reason: HOSPADM

## 2024-06-06 RX ORDER — ACETAMINOPHEN 10 MG/ML
INJECTION, SOLUTION INTRAVENOUS
Status: DISCONTINUED | OUTPATIENT
Start: 2024-06-06 | End: 2024-06-06

## 2024-06-06 RX ORDER — MISOPROSTOL 100 UG/1
800 TABLET ORAL ONCE AS NEEDED
Status: COMPLETED | OUTPATIENT
Start: 2024-06-06 | End: 2024-06-06

## 2024-06-06 RX ORDER — LABETALOL HCL 20 MG/4 ML
80 SYRINGE (ML) INTRAVENOUS ONCE AS NEEDED
Status: DISCONTINUED | OUTPATIENT
Start: 2024-06-06 | End: 2024-06-06

## 2024-06-06 RX ORDER — MORPHINE SULFATE 10 MG/ML
INJECTION INTRAMUSCULAR; INTRAVENOUS; SUBCUTANEOUS
Status: DISCONTINUED | OUTPATIENT
Start: 2024-06-06 | End: 2024-06-06

## 2024-06-06 RX ORDER — FAMOTIDINE 10 MG/ML
20 INJECTION INTRAVENOUS
Status: DISCONTINUED | OUTPATIENT
Start: 2024-06-06 | End: 2024-06-10 | Stop reason: HOSPADM

## 2024-06-06 RX ADMIN — ACETAMINOPHEN 650 MG: 325 TABLET, FILM COATED ORAL at 12:06

## 2024-06-06 RX ADMIN — CALCIUM CARBONATE (ANTACID) CHEW TAB 500 MG 500 MG: 500 CHEW TAB at 12:06

## 2024-06-06 RX ADMIN — MORPHINE SULFATE 4 MG: 4 INJECTION INTRAVENOUS at 08:06

## 2024-06-06 RX ADMIN — LABETALOL HYDROCHLORIDE 20 MG: 5 INJECTION, SOLUTION INTRAVENOUS at 12:06

## 2024-06-06 RX ADMIN — KETOROLAC TROMETHAMINE 30 MG: 30 INJECTION, SOLUTION INTRAMUSCULAR; INTRAVENOUS at 06:06

## 2024-06-06 RX ADMIN — Medication 500 ML/HR: at 05:06

## 2024-06-06 RX ADMIN — FENTANYL CITRATE 100 MCG: 50 INJECTION, SOLUTION INTRAMUSCULAR; INTRAVENOUS at 05:06

## 2024-06-06 RX ADMIN — BUPIVACAINE HYDROCHLORIDE 4 ML: 5 INJECTION, SOLUTION EPIDURAL; INTRACAUDAL; PERINEURAL at 05:06

## 2024-06-06 RX ADMIN — MISOPROSTOL 800 MCG: 100 TABLET ORAL at 08:06

## 2024-06-06 RX ADMIN — SODIUM CHLORIDE, POTASSIUM CHLORIDE, SODIUM LACTATE AND CALCIUM CHLORIDE 500 ML: 600; 310; 30; 20 INJECTION, SOLUTION INTRAVENOUS at 12:06

## 2024-06-06 RX ADMIN — LIDOCAINE HYDROCHLORIDE 10 ML: 20 INJECTION, SOLUTION EPIDURAL; INFILTRATION; INTRACAUDAL; PERINEURAL at 05:06

## 2024-06-06 RX ADMIN — ONDANSETRON 4 MG: 2 INJECTION INTRAMUSCULAR; INTRAVENOUS at 05:06

## 2024-06-06 RX ADMIN — CEFAZOLIN 2 G: 330 INJECTION, POWDER, FOR SOLUTION INTRAMUSCULAR; INTRAVENOUS at 05:06

## 2024-06-06 RX ADMIN — SODIUM CHLORIDE, POTASSIUM CHLORIDE, SODIUM LACTATE AND CALCIUM CHLORIDE: 600; 310; 30; 20 INJECTION, SOLUTION INTRAVENOUS at 05:06

## 2024-06-06 RX ADMIN — MORPHINE SULFATE 3 MG: 10 INJECTION, SOLUTION INTRAMUSCULAR; INTRAVENOUS at 06:06

## 2024-06-06 RX ADMIN — CALCIUM CARBONATE (ANTACID) CHEW TAB 500 MG 500 MG: 500 CHEW TAB at 07:06

## 2024-06-06 RX ADMIN — NIFEDIPINE 90 MG: 90 TABLET, FILM COATED, EXTENDED RELEASE ORAL at 08:06

## 2024-06-06 RX ADMIN — SERTRALINE HYDROCHLORIDE 50 MG: 50 TABLET ORAL at 12:06

## 2024-06-06 RX ADMIN — TRANEXAMIC ACID 1000 MG: 100 INJECTION, SOLUTION INTRAVENOUS at 05:06

## 2024-06-06 RX ADMIN — Medication 12 ML/HR: at 01:06

## 2024-06-06 RX ADMIN — AZITHROMYCIN MONOHYDRATE 250 G: 500 INJECTION, POWDER, LYOPHILIZED, FOR SOLUTION INTRAVENOUS at 05:06

## 2024-06-06 RX ADMIN — SODIUM CHLORIDE, POTASSIUM CHLORIDE, SODIUM LACTATE AND CALCIUM CHLORIDE 500 ML: 600; 310; 30; 20 INJECTION, SOLUTION INTRAVENOUS at 02:06

## 2024-06-06 RX ADMIN — HYDRALAZINE HYDROCHLORIDE 50 MG: 25 TABLET ORAL at 08:06

## 2024-06-06 RX ADMIN — ACETAMINOPHEN 1000 MG: 10 INJECTION, SOLUTION INTRAVENOUS at 05:06

## 2024-06-06 RX ADMIN — OXYTOCIN 2 MILLI-UNITS/MIN: 10 INJECTION, SOLUTION INTRAMUSCULAR; INTRAVENOUS at 08:06

## 2024-06-06 RX ADMIN — LABETALOL HYDROCHLORIDE 300 MG: 200 TABLET, FILM COATED ORAL at 11:06

## 2024-06-06 RX ADMIN — TRANEXAMIC ACID 1000 MG: 100 INJECTION, SOLUTION INTRAVENOUS at 10:06

## 2024-06-06 RX ADMIN — ALUMINUM HYDROXIDE, MAGNESIUM HYDROXIDE, AND SIMETHICONE 30 ML: 200; 200; 20 SUSPENSION ORAL at 01:06

## 2024-06-06 RX ADMIN — TRANEXAMIC ACID 1000 MG: 100 INJECTION, SOLUTION INTRAVENOUS at 11:06

## 2024-06-06 RX ADMIN — LABETALOL HYDROCHLORIDE 40 MG: 5 INJECTION, SOLUTION INTRAVENOUS at 12:06

## 2024-06-06 RX ADMIN — OXYTOCIN 95 MILLI-UNITS/MIN: 10 INJECTION, SOLUTION INTRAMUSCULAR; INTRAVENOUS at 06:06

## 2024-06-06 RX ADMIN — SODIUM CITRATE AND CITRIC ACID MONOHYDRATE 30 ML: 500; 334 SOLUTION ORAL at 02:06

## 2024-06-06 NOTE — PROGRESS NOTES
06/06/24 1628   TeleStork Mike Note - Strip   Strip Reviewed by Mike Nurse? Yes   TeleStork Mike Note - Communication   Reading Nurse Communicated with Bedside Nurse Regarding: Fetal Status   TeleStork Mike Note - Notification   Nurse Notified? Yes   Name of Nurse Staff at desk - RN at bedside

## 2024-06-06 NOTE — ANESTHESIA PROCEDURE NOTES
Epidural    Patient location during procedure: OB   Reason for block: primary anesthetic   Reason for block: labor analgesia requested by patient and obstetrician  Diagnosis: Labor pain relief   Start time: 6/6/2024 1:28 PM  Timeout: 6/6/2024 1:28 PM  End time: 6/6/2024 1:47 PM    Staffing  Performing Provider: Rudi Lim MD  Authorizing Provider: Rudi Lim MD    Staffing  Performed by: Rudi Lim MD  Authorized by: Rudi Lim MD        Preanesthetic Checklist  Completed: patient identified, IV checked, site marked, risks and benefits discussed, surgical consent, monitors and equipment checked, pre-op evaluation, timeout performed, anesthesia consent given, hand hygiene performed and patient being monitored  Preparation  Patient position: sitting (Mask worn, sterile gloves worn)  Prep: ChloraPrep and Pt preloaded with 500 to 1000ml of crystalloid  Patient monitoring: Pulse Ox (Pre & Post procedure vitals & FHR are recorded by the nurse Q 5mins as appropriate)  Reason for block: primary anesthetic   Epidural  Skin Anesthetic: lidocaine 1% (25G 1.5inch needle)  Skin Wheal: 5 mL  Administration type: continuous  Approach: midline  Interspace: L3-4    Injection technique: SEJAL saline  Needle and Epidural Catheter  Needle type: Tuohy   Needle gauge: 17  Needle length: 3.5 inches  Needle insertion depth: 7 cm  Catheter type: springwound and multi-orifice  Catheter size: 18 G  Catheter at skin depth: 14 cm  Insertion Attempts: 1  Test dose: 3 mL of lidocaine 1.5% with Epi 1-to-200,000 (Test dose given via epidural catheter)  Additional Documentation: negative aspiration for heme and CSF, no paresthesia on injection, no significant pain on injection, incremental injection, no signs/symptoms of IV or SA injection and no significant complaints from patient (Epidural catheter connected to epidural pump with filter in situ and pt instructed on its use.  Warning label placed on epidural  catheter.)  Needle localization: anatomical landmarks  Assessment  Ease of block: easy  Patient's tolerance of the procedure: comfortable throughout block (Pt returned to supine position with head of bed elevated 30 degrees and DEJON applied as needed)  Additional Notes    [place] pump set to give initial 10ml bolus followed by 12ml/hr with bolus of 2ml, lockout interval 15mins, max 4 boluses/hr, Infusion conc Marcaine 0.125% + Fentanyl 2mcg/ml  Improvement in pain relief documented by L&D nurses  L&D nurses instructed to call if no relief at all after 30 mins No inadvertent dural puncture with Tuohy.  Dural puncture not performed with spinal needle

## 2024-06-06 NOTE — PROGRESS NOTES
06/06/24 1538   TeleStork Mike Note - Strip   Strip Reviewed by Mike Nurse? Yes   TeleStork Mike Note - Communication   [place] Nurse Communicated with Bedside Nurse Regarding: Fetal Status   TeleStork Mike Note - Notification   Nurse Notified? Yes   Name of Nurse [place]

## 2024-06-06 NOTE — ANESTHESIA PREPROCEDURE EVALUATION
06/06/2024  Sonja Ryderacheantonietta is a 43 y.o., female.      Pre-op Assessment    I have reviewed the Patient Summary Reports.     I have reviewed the Nursing Notes. I have reviewed the NPO Status.   I have reviewed the Medications.     Review of Systems  Anesthesia Hx:  No problems with previous Anesthesia                Cardiovascular:  Exercise tolerance: good                                               Physical Exam  General: Well nourished and Cooperative    Airway:  Mallampati: II   Mouth Opening: Normal  TM Distance: Normal  Tongue: Normal  Neck ROM: Normal ROM    Dental:  Intact    Chest/Lungs:  Clear to auscultation    Heart:  Rhythm: Regular Rhythm        Anesthesia Plan  Type of Anesthesia, risks & benefits discussed:    Anesthesia Type: Epidural  Intra-op Monitoring Plan: Standard ASA Monitors  Post Op Pain Control Plan: multimodal analgesia  Informed Consent: Informed consent signed with the Patient and all parties understand the risks and agree with anesthesia plan.  All questions answered.   ASA Score: 2  Day of Surgery Review of History & Physical: H&P Update referred to the surgeon/provider.    Ready For Surgery From Anesthesia Perspective.     .  I explained anesthesia plan to patient/responsbile party if available.  Anesthesia consent done going over the material facts, risks, complications & alternatives, obtained which includes the possibility of altering the anesthesia plan.  I reviewed problem list, prior to admission medication list, appropriate labs, any workup, Xray, EKG etc noted below.  Patients condition is satisfactory to proceed with anesthesia plan unless otherwise noted (see anesthesia chart for details of the anesthesia plan carried out).      Pre-operative evaluation for * No surgery found *    BP (!) 138/93   Pulse 84   Temp 36.8 °C (98.2 °F)   Resp 16   LMP 09/28/2023  (Exact Date)   SpO2 99%     Patient Active Problem List   Diagnosis    - Multigravida of advanced maternal age in third trimester    - Chronic hypertension affecting pregnancy    - Recurrent pregnancy loss in pregnant patient in third trimester, antepartum    - History of prior pregnancy with IUGR     Gestational hypertension, third trimester    - Fetal hydronephrosis during pregnancy, antepartum       Review of patient's allergies indicates:  No Known Allergies    Current Outpatient Medications   Medication Instructions    aspirin (ECOTRIN) 81 mg, Oral, Daily    hydrALAZINE (APRESOLINE) 50 mg, Oral, 4 times daily    labetaloL (NORMODYNE) 200 mg, Oral, Every 8 hours    NIFEdipine (PROCARDIA-XL) 90 mg, Oral, 2 times daily    PNV,calcium 72/iron/folic acid (PRENATAL VITAMIN) Tab 1 tablet, Oral, Daily    sertraline (ZOLOFT) 50 mg, Oral, Daily       Past Surgical History:   Procedure Laterality Date    BUNIONECTOMY      TONSILLECTOMY AND ADENOIDECTOMY      WISDOM TOOTH EXTRACTION         Social History     Socioeconomic History    Marital status:      Spouse name: Dale Hoff   Tobacco Use    Smoking status: Never   Substance and Sexual Activity    Alcohol use: Not Currently     Alcohol/week: 2.0 standard drinks of alcohol     Types: 2 Glasses of wine per week     Comment: red wine once a month    Drug use: Never    Sexual activity: Yes     Partners: Male     Birth control/protection: Other-see comments     Comment: Natural Family Planning - [place] method     Social Determinants of Health     Financial Resource Strain: Low Risk  (2020)    Received from [facility] Its Subsidiaries and Affiliates    Overall Financial Resource Strain (CARDIA)     Difficulty of Paying Living Expenses: Not hard at all   Food Insecurity: No Food Insecurity (2020)    [facility] and Its Subsidiaries and Affiliates     Hunger Vital Sign     Worried About Running Out of Food in the Last Year: Never true     Ran Out of Food in the Last Year: Never true   Transportation Needs: No Transportation Needs (9/13/2020)    [facility]    PRAPARE - Transportation     Lack of Transportation (Medical): No     Lack of Transportation (Non-Medical): No   Physical Activity: Unknown (9/13/2020)    [facility]    Exercise Vital Sign     Days of Exercise per Week: Patient declined     Minutes of Exercise per Session: Patient declined   Stress: No Stress Concern Present (9/13/2020)    [facility]    [employer] - Occupational Stress Questionnaire     Feeling of Stress : Not at all       Lab Results   Component Value Date    WBC 13.38 (H) 04/30/2024    HGB 13.9 04/30/2024    HCT 38.8 04/30/2024    MCV 89.8 04/30/2024     04/30/2024          BMP  Lab Results   Component Value Date    HCT 38.8 04/30/2024     04/30/2024    K 4.1 04/30/2024    BUN 9.9 04/30/2024    CREATININE 0.53 (L) 04/30/2024    CALCIUM 8.9 04/30/2024            Rudi Lim MD

## 2024-06-06 NOTE — PROGRESS NOTES
06/06/24 1705   Tele[facility] Mike Note - Strip   Strip Reviewed by Mike Nurse? Yes   Tele[facility] Mike Note - Communication   [place] Nurse Communicated with Bedside Nurse Regarding: Fetal Status   [facility] Mike Note - Notification   Nurse Notified? Yes   Name of Nurse Staff at desk - RN at bedside

## 2024-06-06 NOTE — H&P
HISTORY AND PHYSICAL                                                OBSTETRICS        Chief Complaint: Labor induction     Subjective:      Sonja Hoff is a 43 y.o.  female with IUP at 36w0d gestation who presents to L&D for induction of labor.  Patient denies vaginal bleeding, leaking of fluid, or regular contractions.    Although she does have significant CHTN and suspected Gest HTN being managed with MFM, the patient's pregnancy has been uncomplicated thus far.  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    Care this pregnancy has been with Dr. Coreas (since 2024 tx).  See antepartum record for details.      PMHx:   Past Medical History:   Diagnosis Date    Anxiety and depression     Hypertension     Mitral valve prolapse     Pregnancy 2023       PSHx:   Past Surgical History:   Procedure Laterality Date    BUNIONECTOMY      TONSILLECTOMY AND ADENOIDECTOMY      WISDOM TOOTH EXTRACTION         All: Review of patient's allergies indicates:  No Known Allergies    Meds:   Medications Prior to Admission   Medication Sig Dispense Refill Last Dose    aspirin (ECOTRIN) 81 MG EC tablet Take 81 mg by mouth once daily.       hydrALAZINE (APRESOLINE) 50 MG tablet Take 1 tablet (50 mg total) by mouth 4 (four) times daily. 120 tablet 1     labetaloL (NORMODYNE) 200 MG tablet Take 1 tablet (200 mg total) by mouth every 8 (eight) hours. (Patient taking differently: Take 300 mg by mouth every 8 (eight) hours.) 90 tablet 3     NIFEdipine (PROCARDIA-XL) 90 MG (OSM) 24 hr tablet Take 1 tablet (90 mg total) by mouth 2 (two) times a day. 60 tablet 1     PNV,calcium 72/iron/folic acid (PRENATAL VITAMIN) Tab Take 1 tablet by mouth once daily. 30 tablet 11     sertraline (ZOLOFT) 50 MG tablet Take 50 mg by mouth once daily.          SH:   Social History     Socioeconomic History    Marital status:      Spouse name: Dale Hoff   Tobacco Use    Smoking status: Never    Substance and Sexual Activity    Alcohol use: Not Currently     Alcohol/week: 2.0 standard drinks of alcohol     Types: 2 Glasses of wine per week     Comment: red wine once a month    Drug use: Never    Sexual activity: Yes     Partners: Male     Birth control/protection: Other-see comments     Comment: Natural Family Planning - [place]avio method     Social Determinants of Health     Financial Resource Strain: Low Risk  (9/13/2020)    [facility]    Overall Financial Resource Strain (CARDIA)     Difficulty of Paying Living Expenses: Not hard at all   Food Insecurity: No Food Insecurity (9/13/2020)    [facility]    Hunger Vital Sign     Worried About Running Out of Food in the Last Year: Never true     Ran Out of Food in the Last Year: Never true   Transportation Needs: No Transportation Needs (9/13/2020)    [facility]    PRAPARE - Transportation     Lack of Transportation (Medical): No     Lack of Transportation (Non-Medical): No   Physical Activity: Unknown (9/13/2020)    [facility]    Exercise Vital Sign     Days of Exercise per Week: Patient declined     Minutes of Exercise per Session: Patient declined   Stress: No Stress Concern Present (9/13/2020)    [facility]    [employer] - Occupational Stress Questionnaire     Feeling of Stress : Not at all       FH:   Family History   Problem Relation Name Age of Onset    Diabetes Father Sajan Banks     Hypertension Mother Lorri Banks     Hypertension Brother Low Banks        OBHx:   OB History     Para Term  AB Living   13 7 4 3 5 7   SAB IAB Ectopic Multiple Live Births   5 0 0 0 7      # Outcome Date GA Lbr Mark/2nd Weight Sex Type Anes PTL Lv   13 Current            12 2022 6w0d       FD   11 SAB 2022 6w0d       FD   10  20 35w0d  2.495 kg (5 lb 8 oz) F Vag-Spont   CRYS      Name: Priyanka   9 SAB 2019 11w0d       FD   8  17 36w0d  2.637 kg (5 lb 13 oz) M Vag-Spont   CRYS      Name: Edwin   7 SAB 2016 6w0d       FD   6 SAB 10/2016 6w0d       FD   5  14 36w0d  2.58 kg (5 lb 11 oz) F Vag-Spont   CRYS      Name: Ramona   4 Term 12 37w0d  2.381 kg (5 lb 4 oz) F Vag-Spont   CRYS      Name: Barbie   3 Term 11 38w0d  3.175 kg (7 lb) F Vag-Spont   CRYS      Name: Sommer   2 Term 09 39w0d  2.722 kg (6 lb) F Vag-Spont EPI  CYRS      Name: Preeti   1 Term 08 39w0d  3.629 kg (8 lb) M Vag-Spont   CRYS      Name: Yamil       Objective:      [unfilled]  [unfilled]  BMI Readings from Last 1 Encounters:   24 27.62 kg/m²         General:   alert and cooperative   HEENT:  normocephalic, atraumatic   Lungs:   Normal work of breathing   Heart:   Normal cap refill   Abdomen:  gravid, non-tender   Extremities non-tender, no edema   Derm: no rashes or lesions   Psych: appropriate mood and affect   Pelvis:  adequate       Cervix:     Dilation: 3 cm    Effacement: 75%    Station:  -3     Vertex presentation by palpation    Lab Review  Prenatal Labs:  Lab Results   Component Value Date    GROUPTRH O POS 2024    INDCOGEL NEG 2024    HGB 13.9 2024    HCT 38.8 2024     2024    STREPBCULT Negative 2024        Assessment:     43 y.o.  at 36w0d gestation here for induction of labor     Plan:     1. Risks, benefits, alternatives and possible complications of delivery, possible , possible blood transfusion, possible operative vaginal delivery have been discussed in detail with  the patient. All questions have been answered, and Ms. Hoff has voiced understanding and agrees to the treatment plan.  2. Admit to Labor and Delivery unit for labor induction with Pitocin.

## 2024-06-06 NOTE — TRANSFER OF CARE
Anesthesia Transfer of Care Note    Patient: Lacy L Brupbacher    Procedure(s) Performed: Procedure(s) (LRB):   SECTION (N/A)    Patient location: Labor and Delivery    Anesthesia Type: spinal    Transport from OR: Transported from OR on room air with adequate spontaneous ventilation    Post pain: adequate analgesia    Post assessment: no apparent anesthetic complications and tolerated procedure well    Post vital signs: stable    Level of consciousness: awake, alert and oriented    Nausea/Vomiting: no nausea/vomiting    Complications: none    Transfer of care protocol was followed    Last vitals: Visit Vitals  /71 (BP Location: Right arm, Patient Position: Sitting)   Pulse 81   Temp 36.5 °C (97.7 °F) (Oral)   Resp 18   LMP 2023 (Exact Date)   SpO2 100%

## 2024-06-07 LAB
ALBUMIN SERPL-MCNC: 2.4 G/DL (ref 3.5–5)
ALBUMIN/GLOB SERPL: 1.2 RATIO (ref 1.1–2)
ALP SERPL-CCNC: 101 UNIT/L (ref 40–150)
ALT SERPL-CCNC: 26 UNIT/L (ref 0–55)
ANION GAP SERPL CALC-SCNC: 6 MEQ/L
AST SERPL-CCNC: 37 UNIT/L (ref 5–34)
BASOPHILS # BLD AUTO: 0.05 X10(3)/MCL
BASOPHILS NFR BLD AUTO: 0.3 %
BILIRUB SERPL-MCNC: 0.6 MG/DL
BUN SERPL-MCNC: 9.6 MG/DL (ref 7–18.7)
CALCIUM SERPL-MCNC: 7.6 MG/DL (ref 8.4–10.2)
CHLORIDE SERPL-SCNC: 103 MMOL/L (ref 98–107)
CO2 SERPL-SCNC: 21 MMOL/L (ref 22–29)
CREAT SERPL-MCNC: 0.6 MG/DL (ref 0.55–1.02)
CREAT/UREA NIT SERPL: 16
EOSINOPHIL # BLD AUTO: 0.03 X10(3)/MCL (ref 0–0.9)
EOSINOPHIL NFR BLD AUTO: 0.2 %
ERYTHROCYTE [DISTWIDTH] IN BLOOD BY AUTOMATED COUNT: 12.8 % (ref 11.5–17)
GFR SERPLBLD CREATININE-BSD FMLA CKD-EPI: >60 ML/MIN/1.73/M2
GLOBULIN SER-MCNC: 2 GM/DL (ref 2.4–3.5)
GLUCOSE SERPL-MCNC: 88 MG/DL (ref 74–100)
HCT VFR BLD AUTO: 24.6 % (ref 37–47)
HGB BLD-MCNC: 8.4 G/DL (ref 12–16)
IMM GRANULOCYTES # BLD AUTO: 0.12 X10(3)/MCL (ref 0–0.04)
IMM GRANULOCYTES NFR BLD AUTO: 0.6 %
LYMPHOCYTES # BLD AUTO: 1.27 X10(3)/MCL (ref 0.6–4.6)
LYMPHOCYTES NFR BLD AUTO: 6.8 %
MCH RBC QN AUTO: 31.7 PG (ref 27–31)
MCHC RBC AUTO-ENTMCNC: 34.1 G/DL (ref 33–36)
MCV RBC AUTO: 92.8 FL (ref 80–94)
MONOCYTES # BLD AUTO: 1.4 X10(3)/MCL (ref 0.1–1.3)
MONOCYTES NFR BLD AUTO: 7.5 %
NEUTROPHILS # BLD AUTO: 15.74 X10(3)/MCL (ref 2.1–9.2)
NEUTROPHILS NFR BLD AUTO: 84.6 %
NRBC BLD AUTO-RTO: 0 %
PLATELET # BLD AUTO: 210 X10(3)/MCL (ref 130–400)
PMV BLD AUTO: 10.2 FL (ref 7.4–10.4)
POTASSIUM SERPL-SCNC: 4.6 MMOL/L (ref 3.5–5.1)
PROT SERPL-MCNC: 4.4 GM/DL (ref 6.4–8.3)
RBC # BLD AUTO: 2.65 X10(6)/MCL (ref 4.2–5.4)
SODIUM SERPL-SCNC: 130 MMOL/L (ref 136–145)
WBC # SPEC AUTO: 18.61 X10(3)/MCL (ref 4.5–11.5)

## 2024-06-07 PROCEDURE — 25000003 PHARM REV CODE 250: Performed by: OBSTETRICS & GYNECOLOGY

## 2024-06-07 PROCEDURE — 25000003 PHARM REV CODE 250: Performed by: STUDENT IN AN ORGANIZED HEALTH CARE EDUCATION/TRAINING PROGRAM

## 2024-06-07 PROCEDURE — 63600175 PHARM REV CODE 636 W HCPCS: Performed by: OBSTETRICS & GYNECOLOGY

## 2024-06-07 PROCEDURE — 63600175 PHARM REV CODE 636 W HCPCS: Performed by: STUDENT IN AN ORGANIZED HEALTH CARE EDUCATION/TRAINING PROGRAM

## 2024-06-07 PROCEDURE — 85025 COMPLETE CBC W/AUTO DIFF WBC: CPT | Performed by: STUDENT IN AN ORGANIZED HEALTH CARE EDUCATION/TRAINING PROGRAM

## 2024-06-07 PROCEDURE — 11000001 HC ACUTE MED/SURG PRIVATE ROOM

## 2024-06-07 PROCEDURE — 36415 COLL VENOUS BLD VENIPUNCTURE: CPT | Performed by: STUDENT IN AN ORGANIZED HEALTH CARE EDUCATION/TRAINING PROGRAM

## 2024-06-07 PROCEDURE — 80053 COMPREHEN METABOLIC PANEL: CPT | Performed by: STUDENT IN AN ORGANIZED HEALTH CARE EDUCATION/TRAINING PROGRAM

## 2024-06-07 RX ORDER — ONDANSETRON 4 MG/1
8 TABLET, ORALLY DISINTEGRATING ORAL EVERY 8 HOURS PRN
Status: DISCONTINUED | OUTPATIENT
Start: 2024-06-07 | End: 2024-06-10 | Stop reason: HOSPADM

## 2024-06-07 RX ORDER — PRENATAL WITH FERROUS FUM AND FOLIC ACID 3080; 920; 120; 400; 22; 1.84; 3; 20; 10; 1; 12; 200; 27; 25; 2 [IU]/1; [IU]/1; MG/1; [IU]/1; MG/1; MG/1; MG/1; MG/1; MG/1; MG/1; UG/1; MG/1; MG/1; MG/1; MG/1
1 TABLET ORAL DAILY
Status: DISCONTINUED | OUTPATIENT
Start: 2024-06-07 | End: 2024-06-10 | Stop reason: HOSPADM

## 2024-06-07 RX ORDER — ACETAMINOPHEN 500 MG
1000 TABLET ORAL EVERY 6 HOURS
Status: DISCONTINUED | OUTPATIENT
Start: 2024-06-07 | End: 2024-06-07

## 2024-06-07 RX ORDER — ACETAMINOPHEN 500 MG
1000 TABLET ORAL EVERY 6 HOURS
Status: DISCONTINUED | OUTPATIENT
Start: 2024-06-07 | End: 2024-06-10 | Stop reason: HOSPADM

## 2024-06-07 RX ORDER — OXYTOCIN/RINGER'S LACTATE 30/500 ML
95 PLASTIC BAG, INJECTION (ML) INTRAVENOUS ONCE
Status: DISCONTINUED | OUTPATIENT
Start: 2024-06-07 | End: 2024-06-10 | Stop reason: HOSPADM

## 2024-06-07 RX ORDER — IBUPROFEN 600 MG/1
600 TABLET ORAL EVERY 6 HOURS PRN
Status: DISCONTINUED | OUTPATIENT
Start: 2024-06-08 | End: 2024-06-10 | Stop reason: HOSPADM

## 2024-06-07 RX ORDER — BISACODYL 10 MG/1
10 SUPPOSITORY RECTAL ONCE AS NEEDED
Status: DISCONTINUED | OUTPATIENT
Start: 2024-06-07 | End: 2024-06-10 | Stop reason: HOSPADM

## 2024-06-07 RX ORDER — DIPHENHYDRAMINE HCL 25 MG
50 CAPSULE ORAL NIGHTLY PRN
Status: DISCONTINUED | OUTPATIENT
Start: 2024-06-07 | End: 2024-06-10 | Stop reason: HOSPADM

## 2024-06-07 RX ORDER — OXYCODONE AND ACETAMINOPHEN 10; 325 MG/1; MG/1
1 TABLET ORAL ONCE
Status: COMPLETED | OUTPATIENT
Start: 2024-06-07 | End: 2024-06-07

## 2024-06-07 RX ORDER — OXYCODONE HYDROCHLORIDE 5 MG/1
5 TABLET ORAL EVERY 4 HOURS PRN
Status: DISCONTINUED | OUTPATIENT
Start: 2024-06-07 | End: 2024-06-10 | Stop reason: HOSPADM

## 2024-06-07 RX ORDER — KETOROLAC TROMETHAMINE 30 MG/ML
30 INJECTION, SOLUTION INTRAMUSCULAR; INTRAVENOUS EVERY 6 HOURS PRN
Status: COMPLETED | OUTPATIENT
Start: 2024-06-07 | End: 2024-06-07

## 2024-06-07 RX ORDER — OXYCODONE HYDROCHLORIDE 5 MG/1
10 TABLET ORAL EVERY 4 HOURS PRN
Status: DISCONTINUED | OUTPATIENT
Start: 2024-06-07 | End: 2024-06-10 | Stop reason: HOSPADM

## 2024-06-07 RX ORDER — DOCUSATE SODIUM 100 MG/1
200 CAPSULE, LIQUID FILLED ORAL 2 TIMES DAILY
Status: DISCONTINUED | OUTPATIENT
Start: 2024-06-07 | End: 2024-06-10 | Stop reason: HOSPADM

## 2024-06-07 RX ORDER — ADHESIVE BANDAGE
30 BANDAGE TOPICAL 2 TIMES DAILY PRN
Status: DISCONTINUED | OUTPATIENT
Start: 2024-06-08 | End: 2024-06-10 | Stop reason: HOSPADM

## 2024-06-07 RX ADMIN — DIPHENOXYLATE HYDROCHLORIDE AND ATROPINE SULFATE 2 TABLET: 2.5; .025 TABLET ORAL at 12:06

## 2024-06-07 RX ADMIN — SERTRALINE HYDROCHLORIDE 50 MG: 50 TABLET ORAL at 10:06

## 2024-06-07 RX ADMIN — NIFEDIPINE 90 MG: 90 TABLET, FILM COATED, EXTENDED RELEASE ORAL at 09:06

## 2024-06-07 RX ADMIN — LABETALOL HYDROCHLORIDE 300 MG: 200 TABLET, FILM COATED ORAL at 12:06

## 2024-06-07 RX ADMIN — HYDRALAZINE HYDROCHLORIDE 50 MG: 25 TABLET ORAL at 06:06

## 2024-06-07 RX ADMIN — NIFEDIPINE 90 MG: 90 TABLET, FILM COATED, EXTENDED RELEASE ORAL at 10:06

## 2024-06-07 RX ADMIN — NIFEDIPINE 90 MG: 90 TABLET, FILM COATED, EXTENDED RELEASE ORAL at 12:06

## 2024-06-07 RX ADMIN — DOCUSATE SODIUM 200 MG: 100 CAPSULE, LIQUID FILLED ORAL at 09:06

## 2024-06-07 RX ADMIN — ACETAMINOPHEN 1000 MG: 500 TABLET ORAL at 10:06

## 2024-06-07 RX ADMIN — LABETALOL HYDROCHLORIDE 300 MG: 200 TABLET, FILM COATED ORAL at 10:06

## 2024-06-07 RX ADMIN — HYDRALAZINE HYDROCHLORIDE 50 MG: 25 TABLET ORAL at 12:06

## 2024-06-07 RX ADMIN — OXYCODONE HYDROCHLORIDE AND ACETAMINOPHEN 1 TABLET: 10; 325 TABLET ORAL at 01:06

## 2024-06-07 RX ADMIN — LABETALOL HYDROCHLORIDE 300 MG: 200 TABLET, FILM COATED ORAL at 09:06

## 2024-06-07 RX ADMIN — CARBOPROST TROMETHAMINE 250 MCG: 250 INJECTION, SOLUTION INTRAMUSCULAR at 12:06

## 2024-06-07 RX ADMIN — KETOROLAC TROMETHAMINE 30 MG: 30 INJECTION, SOLUTION INTRAMUSCULAR at 04:06

## 2024-06-07 RX ADMIN — OXYCODONE HYDROCHLORIDE 10 MG: 5 TABLET ORAL at 10:06

## 2024-06-07 RX ADMIN — KETOROLAC TROMETHAMINE 30 MG: 30 INJECTION, SOLUTION INTRAMUSCULAR at 09:06

## 2024-06-07 RX ADMIN — KETOROLAC TROMETHAMINE 30 MG: 30 INJECTION, SOLUTION INTRAMUSCULAR at 06:06

## 2024-06-07 RX ADMIN — PRENATAL VITAMINS-IRON FUMARATE 27 MG IRON-FOLIC ACID 0.8 MG TABLET 1 TABLET: at 10:06

## 2024-06-07 RX ADMIN — DIPHENHYDRAMINE HYDROCHLORIDE 50 MG: 25 CAPSULE ORAL at 02:06

## 2024-06-07 RX ADMIN — DOCUSATE SODIUM 200 MG: 100 CAPSULE, LIQUID FILLED ORAL at 10:06

## 2024-06-07 RX ADMIN — MORPHINE SULFATE 4 MG: 4 INJECTION INTRAVENOUS at 01:06

## 2024-06-07 RX ADMIN — SODIUM CHLORIDE 125 MG: 9 INJECTION, SOLUTION INTRAVENOUS at 01:06

## 2024-06-07 NOTE — PROGRESS NOTES
PostPartum Progress Note        Subjective:      Postpartum Day #1 after LTCS    I was called in a 0100 for pt soaking 1 pad every 30 mins. S/p TXA, cyto, hemabate. Methergine not given with severe HTN.      Pt without complaints at this time     Objective:      Temp:  [97.2 °F (36.2 °C)-98.7 °F (37.1 °C)] 97.5 °F (36.4 °C)  Pulse:  [60-96] 62  Resp:  [12-20] 12  SpO2:  [94 %-100 %] 99 %  BP: (112-186)/() 155/96    Intake/Output Summary (Last 24 hours) at 2024 0204  Last data filed at 2024 0030  Gross per 24 hour   Intake 894.6 ml   Output 2315 ml   Net -1420.4 ml     There is no height or weight on file to calculate BMI.    General: no acute distress  Abdomen: soft, appropriately tender, LTCS incision with bandage in place  SVE: clots removed from vagina and cervix, approx 200cc, bringing EBL total to 1500cc or so. Cervix a tight 3cm dilated, unable to advance higher in the uterus. Michelle not placed due to t  Extremities: non-tender, symmetric    Group & Rh   Date Value Ref Range Status   2024 O POS  Final   2024 O POS  Final     Recent Results (from the past 336 hour(s))   CBC with Differential    Collection Time: 24  6:09 AM   Result Value Ref Range    WBC 12.20 (H) 4.50 - 11.50 x10(3)/mcL    Hgb 13.1 12.0 - 16.0 g/dL    Hct 36.6 (L) 37.0 - 47.0 %    Platelet 256 130 - 400 x10(3)/mcL          Assessment/Plan     43 y.o.  S/P , PPD # 1    -Continue routine postpartum care  -Anticipated d/c POD#3-4    Active Problem List with Overview Notes    Diagnosis Date Noted    - Fetal hydronephrosis during pregnancy, antepartum 2024    Gestational hypertension, third trimester     - Multigravida of advanced maternal age in third trimester 2024    - Chronic hypertension affecting pregnancy 2024    - Recurrent pregnancy loss in pregnant patient in third trimester, antepartum 2024    - History of prior pregnancy with IUGR  2024      PPH  -VSS  -EBL now 1.5L  -CBC now  -discussed that bleeding should be improved with the removal of these clots, but that D&C may still be needed if bleeding not better.       Critical care time spent on the evaluation and treatment of severe organ dysfunction, review of pertinent labs and imaging studies, discussions with consulting providers and discussions with patient/family: >30 minutes.      Rashaun Sweeney MD  06/07/2024 2:04 AM

## 2024-06-07 NOTE — LACTATION NOTE
This note was copied from a baby's chart.  Mom latching baby well, swallowing noted. Mom is supplementing with formula too. Discussed triple feeds. Mom was set up with a pump and expressed 4mls. Discussed LPI status and the need for triple feeds.    Mom is on medications for her blood pressure. While all are in an acceptable category, explained to mom that she should ask pediatrician if they are okay with her breastfeeding while on these meds at the prescribed doses.     Reviewed pumping, cleaning kit, milk storage and warming. Mom is experienced with breastfeeding. Answered mom and dads questions. Offered further assistance if needed. Verbalized understanding of all.

## 2024-06-07 NOTE — OP NOTE
[facility] - Labor and Delivery   Section   Operative Note     SUMMARY      Date of Procedure: 2024     Procedure: Procedure(s) (LRB):   SECTION (N/A)     Surgeon(s) and Role:     * Casey Coreas MD - Primary             PREOPERATIVE DIAGNOSIS:    NRFHT  Gest HTN at 36w  POSTOPERATIVE DIAGNOSIS:       1. AMANDA  2. Status post primaryt lower transverse       EBL: 1000ml     OPERATION:       1. repeat low transverse .        Complications:  none     Findings: viable female infant.   Normal-appearing uterus tubes and ovaries.    Anesthesia:  epidural     Assist: Dr. CHRIS Sweeney, who whose assistance was essential during all the key portions of this repeat procedure.     PROCEDURE IN DETAIL:  After informed consent was verified the patient was taken to the operating room with IV fluid running.  2 grams of Ancef was given prior to the case.  Spinal anesthesia was administered after which she was prepped and draped in dorsal supine position with leftward tilt.  After adequate anesthesia was verified a Pfannenstiel skin incision was made with a scalpel and carried through to the underlying layer of fascia sharply.  The fascial incision was extended laterally with curved Bragg scissors.  The superior aspect of the fascial incision was grasped with Beverly clamps x two and dissected off the underlying rectus muscles both bluntly and sharply.  The rectus muscles were  in the midline.  The peritoneum was  then entered sharply with Metzenbaum scissors.  The peritoneal incision was then extended laterally also with digits.  The bladder blade was then placed.  Vesical peritoneum was developed and entered sharply with Metzenbaum scissors.  Bladder blade was then replaced retracting the bladder away from the lower uterine segment.  The lower uterine segment of the uterus was incised in a transverse fashion with the scalpel.  The uterine incision was then extended  laterally with digits.  The infant's head was delivered with assistance of fundal pressure.  Oropharynx and nares were bulb suctioned.  The cord was doubly clamped and cut.  The infant was then handed off to awaiting attendants.  The placenta was delivered spontaneously.  The uterus was then exteriorized and cleared of all clots and debris.  Using Montes De Oca clamps, the endopelvic facia of the lower uterine segment was carefully dissected away from the bladder. The uterine incision was then closed with 0 Vicryl in running locking fashion.  A second layer of 0 Vicryl was used to reinforce the incision in imbricating fashion.    TXA 1g IV was given due to greater than average oozing from peritoneal.planes.     The posterior cul-de-sac was irrigated.  The uterus was replaced in the abdomen and the uterine incision noted to be hemostatic.  The peritoneum was closed with 2-0 Vicryl in running fashion.  The fascia was reapproximated with 0 Vicryl in running fashion.  The subcutaneous tissue was reapproximated with 2-0 plain gut in running fashion.  The skin was closed InSorb in subcuticular fashion.  The skin was then sealed with Dermabond skin adhesive.  Sponge, lap and needle counts were correct x three.  There were no complications.          Complications: No        Condition: Good     Disposition: PACU - hemodynamically stable.

## 2024-06-07 NOTE — CONSULTS
Admit Assessment      COPY FROM BABY'S CHART    Patient Identification  Girl Sonja Hoff   :  2022  Admit Date:  2024  Attending Provider:  Caroline Martinez MD              Referral:    received case management consult for a family assessment.   I met with mom, Sonja Hoff in her post-partum room. Mom presented appropriate and was cooperative as she napped in bed. Mom did eventually get up for the assessment. Mom expressed appropriate concern for baby's care as she laid in bed previously napping. FOB was also present in the room and interacted with CM during the assessment.       Baby boy/girl, Anne Hoff was born via Vag./ Delivery at 36 weeks WGA weighing 5 lbs and 9 ounces.   OB: Dr. Joss ARENAS: Dr. Caroline Martinez    FOB: (involved/signing birth certificate) Dale Hoff will be on birth certificate.     Car Seat: yes, the family has a car seat.     Safe Sleep: yes, the family has a Bassinet and a Crib.     Car seat Safety and Safe Sleep Discussed: (Resources Provided) yes, family accepting of resources.     WIC/SNAP Benefits: (Resources Provided) the family receives WIC.     Breast Feed/Formula: Both    Breast Pump: The family has a breast pump.     Insurance: Medicaid/UHC    Other Children: 7 other children ages 3,6,9,11,12,15, and 16.    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     Social Concerns: None to note.       History/Current Symptoms of Anxiety/Depression: Mom has a history of Anxiety and PPD and is on medication.     Discussed PPD and identifying symptoms and provided mom with PPD counseling resources and symptom brochure.       Identified Support:  Dale Hoff, EDWARD and Mandy Kavya, maternal grandmother.      History/Current Substance Use: None      Indications of Abuse/Neglect:  None known         Emotional/Behavioral/Cognitive Issues: None known      Current RX Prescriptions:  Zoloft    Adequate Discharge Transportation: yes, the family has transportation    Mom's UDS: N/A    Baby's UDS: N/A    DCFS status: N/A        Verified her face sheet information: yes, information verified.       Living Situation:      Resides at [address] [place] [address] [id number] [place] [id number], phone: [phone] (home).             Plan: CM to continue to follow mom until discharge to provide any additional resources or supports.      Patient/caregiver engaged in treatment planning process.      providing psychosocial and supportive counseling, resources, education, assistance and discharge planning as appropriate.  Patient/caregiver state understanding of  available resources,  following, remains available.        Patient/Caregiver informed of right to choose providers or agencies.  Patient/Caregiver provides permission to release any necessary information to [facility] and to Non[facility] agencies as needed to facilitate patient care, treatment planning, and patient discharge planning.  Written and verbal resources provided.     MOM AND BABY ARE SOCIALLY CLEARED.

## 2024-06-07 NOTE — PLAN OF CARE
Problem: Adult Inpatient Plan of Care  Goal: Plan of Care Review  Outcome: Progressing  Goal: Patient-Specific Goal (Individualized)  Outcome: Progressing  Goal: Absence of Hospital-Acquired Illness or Injury  Outcome: Progressing  Goal: Optimal Comfort and Wellbeing  Outcome: Progressing  Goal: Readiness for Transition of Care  Outcome: Progressing     Problem:  Fall Injury Risk  Goal: Absence of Fall, Infant Drop and Related Injury  Outcome: Progressing     Problem: Infection  Goal: Absence of Infection Signs and Symptoms  Outcome: Progressing     Problem: Labor  Goal: Hemostasis  Outcome: Progressing  Goal: Stable Fetal Wellbeing  Outcome: Progressing  Goal: Effective Progression to Delivery  Outcome: Progressing  Goal: Absence of Infection Signs and Symptoms  Outcome: Progressing  Goal: Acceptable Pain Control  Outcome: Progressing  Goal: Normal Uterine Contraction Pattern  Outcome: Progressing     Problem: Wound  Goal: Optimal Coping  Outcome: Progressing  Goal: Optimal Functional Ability  Outcome: Progressing  Goal: Absence of Infection Signs and Symptoms  Outcome: Progressing  Goal: Improved Oral Intake  Outcome: Progressing  Goal: Optimal Pain Control and Function  Outcome: Progressing  Goal: Skin Health and Integrity  Outcome: Progressing  Goal: Optimal Wound Healing  Outcome: Progressing     Problem: Postpartum ( Delivery)  Goal: Successful Parent Role Transition  Outcome: Progressing  Goal: Hemostasis  Outcome: Progressing  Goal: Effective Bowel Elimination  Outcome: Progressing  Goal: Fluid and Electrolyte Balance  Outcome: Progressing  Goal: Absence of Infection Signs and Symptoms  Outcome: Progressing  Goal: Anesthesia/Sedation Recovery  Outcome: Progressing  Goal: Optimal Pain Control and Function  Outcome: Progressing  Goal: Nausea and Vomiting Relief  Outcome: Progressing  Goal: Effective Urinary Elimination  Outcome: Progressing  Goal: Effective Oxygenation and  Ventilation  Outcome: Progressing

## 2024-06-07 NOTE — NURSING
MD at bedside to assess patient and bleeding at this time. MD performed bimanual clot expression and large amount of clots were taken out and placed on luis pad; weighed and measured in QBL calculator. Michelle not placed at this time due to cervix only dilated to 3 cm before c section was performed earlier in night. MD discussed with patient if bleeding were to get worse this morning that a D&C will have to be done.

## 2024-06-07 NOTE — ANESTHESIA POSTPROCEDURE EVALUATION
Anesthesia Post Evaluation    Patient: Sonja AVILA Brupbacher    Procedure(s) Performed: Procedure(s) (LRB):   SECTION (N/A)    Final Anesthesia Type: epidural      Patient location during evaluation: floor  Patient participation: Yes- Able to Participate  Level of consciousness: awake and alert  Post-procedure vital signs: reviewed and stable  Pain management: adequate  Airway patency: patent  MIESHA mitigation strategies: Use of major conduction anesthesia (spinal/epidural) or peripheral nerve block  PONV status at discharge: No PONV  Anesthetic complications: no      Cardiovascular status: hemodynamically stable  Respiratory status: unassisted, spontaneous ventilation and room air  Hydration status: euvolemic  Follow-up not needed.  Comments: Denies problem c epidural anesthesia               Vitals Value Taken Time   /70 243   Temp 36.7 °C (98 °F) 24   Pulse 73 24 2143   Resp 18 24   SpO2 99 % 24         Event Time   Out of Recovery 19:35:00         Pain/Shantelle Score: Pain Rating Prior to Med Admin: 4 (2024  8:53 PM)  Shantelle Score: 10 (2024  7:30 PM)

## 2024-06-08 PROCEDURE — 25000003 PHARM REV CODE 250: Performed by: STUDENT IN AN ORGANIZED HEALTH CARE EDUCATION/TRAINING PROGRAM

## 2024-06-08 PROCEDURE — 63600175 PHARM REV CODE 636 W HCPCS: Performed by: STUDENT IN AN ORGANIZED HEALTH CARE EDUCATION/TRAINING PROGRAM

## 2024-06-08 PROCEDURE — 25000003 PHARM REV CODE 250: Performed by: OBSTETRICS & GYNECOLOGY

## 2024-06-08 PROCEDURE — 11000001 HC ACUTE MED/SURG PRIVATE ROOM

## 2024-06-08 RX ORDER — IBUPROFEN 600 MG/1
600 TABLET ORAL EVERY 6 HOURS PRN
Status: DISCONTINUED | OUTPATIENT
Start: 2024-06-08 | End: 2024-06-08

## 2024-06-08 RX ORDER — LABETALOL 200 MG/1
200 TABLET, FILM COATED ORAL DAILY
Status: DISCONTINUED | OUTPATIENT
Start: 2024-06-08 | End: 2024-06-09

## 2024-06-08 RX ADMIN — SIMETHICONE 80 MG: 80 TABLET, CHEWABLE ORAL at 09:06

## 2024-06-08 RX ADMIN — ACETAMINOPHEN 1000 MG: 500 TABLET ORAL at 05:06

## 2024-06-08 RX ADMIN — IBUPROFEN 600 MG: 600 TABLET, FILM COATED ORAL at 03:06

## 2024-06-08 RX ADMIN — LABETALOL HYDROCHLORIDE 200 MG: 200 TABLET, FILM COATED ORAL at 09:06

## 2024-06-08 RX ADMIN — DOCUSATE SODIUM 200 MG: 100 CAPSULE, LIQUID FILLED ORAL at 09:06

## 2024-06-08 RX ADMIN — IBUPROFEN 600 MG: 600 TABLET, FILM COATED ORAL at 09:06

## 2024-06-08 RX ADMIN — OXYCODONE HYDROCHLORIDE 10 MG: 5 TABLET ORAL at 09:06

## 2024-06-08 RX ADMIN — OXYCODONE HYDROCHLORIDE 10 MG: 5 TABLET ORAL at 12:06

## 2024-06-08 RX ADMIN — ACETAMINOPHEN 1000 MG: 500 TABLET ORAL at 12:06

## 2024-06-08 RX ADMIN — PRENATAL VITAMINS-IRON FUMARATE 27 MG IRON-FOLIC ACID 0.8 MG TABLET 1 TABLET: at 09:06

## 2024-06-08 RX ADMIN — OXYCODONE HYDROCHLORIDE 10 MG: 5 TABLET ORAL at 10:06

## 2024-06-08 RX ADMIN — HYDRALAZINE HYDROCHLORIDE 50 MG: 25 TABLET ORAL at 12:06

## 2024-06-08 RX ADMIN — SODIUM CHLORIDE 125 MG: 9 INJECTION, SOLUTION INTRAVENOUS at 09:06

## 2024-06-08 RX ADMIN — SERTRALINE HYDROCHLORIDE 50 MG: 50 TABLET ORAL at 09:06

## 2024-06-08 NOTE — PROGRESS NOTES
PostPartum Progress Note        Subjective:      Postpartum Day #1 after LTCS    Doing well. Passing flatus. Pain controlled. Ambulating.    Objective:      Temp:  [98.4 °F (36.9 °C)-99.5 °F (37.5 °C)] 98.5 °F (36.9 °C)  Pulse:  [62-86] 83  Resp:  [16-18] 18  SpO2:  [90 %-95 %] 95 %  BP: ()/(58-70) 107/67  No intake or output data in the 24 hours ending 24 0809    There is no height or weight on file to calculate BMI.    General: no acute distress  Abdomen: soft, appropriately tender, LTCS incision well healing  Extremities: non-tender, symmetric    Group & Rh   Date Value Ref Range Status   2024 O POS  Final   2024 O POS  Final     Recent Results (from the past 336 hour(s))   CBC with Differential    Collection Time: 24  4:31 AM   Result Value Ref Range    WBC 18.61 (H) 4.50 - 11.50 x10(3)/mcL    Hgb 8.4 (L) 12.0 - 16.0 g/dL    Hct 24.6 (L) 37.0 - 47.0 %    Platelet 210 130 - 400 x10(3)/mcL   CBC with Differential    Collection Time: 24  6:09 AM   Result Value Ref Range    WBC 12.20 (H) 4.50 - 11.50 x10(3)/mcL    Hgb 13.1 12.0 - 16.0 g/dL    Hct 36.6 (L) 37.0 - 47.0 %    Platelet 256 130 - 400 x10(3)/mcL          Assessment/Plan     43 y.o.  S/P , PPD # 1    -Continue routine postpartum care  -Anticipated d/c POD#3-4    Active Problem List with Overview Notes    Diagnosis Date Noted    - Fetal hydronephrosis during pregnancy, antepartum 2024    Gestational hypertension, third trimester     - Multigravida of advanced maternal age in third trimester 2024    - Chronic hypertension affecting pregnancy 2024    - Recurrent pregnancy loss in pregnant patient in third trimester, antepartum 2024    - History of prior pregnancy with IUGR  2024     S/p PPH    Rashaun Sweeney MD  2024 2:04 AM

## 2024-06-09 PROCEDURE — 25000003 PHARM REV CODE 250: Performed by: STUDENT IN AN ORGANIZED HEALTH CARE EDUCATION/TRAINING PROGRAM

## 2024-06-09 PROCEDURE — 25000003 PHARM REV CODE 250: Performed by: OBSTETRICS & GYNECOLOGY

## 2024-06-09 PROCEDURE — 63600175 PHARM REV CODE 636 W HCPCS: Performed by: STUDENT IN AN ORGANIZED HEALTH CARE EDUCATION/TRAINING PROGRAM

## 2024-06-09 PROCEDURE — 11000001 HC ACUTE MED/SURG PRIVATE ROOM

## 2024-06-09 RX ORDER — LABETALOL 100 MG/1
100 TABLET, FILM COATED ORAL ONCE
Status: COMPLETED | OUTPATIENT
Start: 2024-06-09 | End: 2024-06-09

## 2024-06-09 RX ORDER — NIFEDIPINE 60 MG/1
60 TABLET, EXTENDED RELEASE ORAL NIGHTLY
Status: DISCONTINUED | OUTPATIENT
Start: 2024-06-09 | End: 2024-06-10 | Stop reason: HOSPADM

## 2024-06-09 RX ADMIN — ACETAMINOPHEN 1000 MG: 500 TABLET ORAL at 03:06

## 2024-06-09 RX ADMIN — SODIUM CHLORIDE 125 MG: 9 INJECTION, SOLUTION INTRAVENOUS at 10:06

## 2024-06-09 RX ADMIN — NIFEDIPINE 60 MG: 60 TABLET, FILM COATED, EXTENDED RELEASE ORAL at 08:06

## 2024-06-09 RX ADMIN — LABETALOL HYDROCHLORIDE 200 MG: 200 TABLET, FILM COATED ORAL at 07:06

## 2024-06-09 RX ADMIN — IBUPROFEN 600 MG: 600 TABLET, FILM COATED ORAL at 11:06

## 2024-06-09 RX ADMIN — OXYCODONE 5 MG: 5 TABLET ORAL at 07:06

## 2024-06-09 RX ADMIN — SERTRALINE HYDROCHLORIDE 50 MG: 50 TABLET ORAL at 07:06

## 2024-06-09 RX ADMIN — OXYCODONE HYDROCHLORIDE 10 MG: 5 TABLET ORAL at 06:06

## 2024-06-09 RX ADMIN — IBUPROFEN 600 MG: 600 TABLET, FILM COATED ORAL at 06:06

## 2024-06-09 RX ADMIN — LABETALOL HYDROCHLORIDE 300 MG: 200 TABLET, FILM COATED ORAL at 08:06

## 2024-06-09 RX ADMIN — DOCUSATE SODIUM 200 MG: 100 CAPSULE, LIQUID FILLED ORAL at 07:06

## 2024-06-09 RX ADMIN — LABETALOL HYDROCHLORIDE 100 MG: 100 TABLET, FILM COATED ORAL at 08:06

## 2024-06-09 RX ADMIN — SIMETHICONE 80 MG: 80 TABLET, CHEWABLE ORAL at 07:06

## 2024-06-09 RX ADMIN — ACETAMINOPHEN 1000 MG: 500 TABLET ORAL at 10:06

## 2024-06-09 RX ADMIN — DOCUSATE SODIUM 200 MG: 100 CAPSULE, LIQUID FILLED ORAL at 08:06

## 2024-06-09 RX ADMIN — LABETALOL HYDROCHLORIDE 300 MG: 200 TABLET, FILM COATED ORAL at 03:06

## 2024-06-09 RX ADMIN — ACETAMINOPHEN 1000 MG: 500 TABLET ORAL at 06:06

## 2024-06-09 RX ADMIN — IBUPROFEN 600 MG: 600 TABLET, FILM COATED ORAL at 04:06

## 2024-06-09 RX ADMIN — PRENATAL VITAMINS-IRON FUMARATE 27 MG IRON-FOLIC ACID 0.8 MG TABLET 1 TABLET: at 07:06

## 2024-06-09 NOTE — PROGRESS NOTES
PostPartum Progress Note        Subjective:      Postpartum Day #3 after LTCS    Doing well. Passing flatus. Pain controlled. Ambulating.    Objective:      Temp:  [97.7 °F (36.5 °C)-98.7 °F (37.1 °C)] 98.1 °F (36.7 °C)  Pulse:  [] 80  Resp:  [16-18] 16  SpO2:  [94 %-100 %] 99 %  BP: (121-166)/(69-97) 166/97  No intake or output data in the 24 hours ending 24 0819    There is no height or weight on file to calculate BMI.    General: no acute distress  Abdomen: soft, appropriately tender, LTCS incision well healing  Extremities: non-tender, symmetric    Group & Rh   Date Value Ref Range Status   2024 O POS  Final   2024 O POS  Final     Recent Results (from the past 336 hour(s))   CBC with Differential    Collection Time: 24  4:31 AM   Result Value Ref Range    WBC 18.61 (H) 4.50 - 11.50 x10(3)/mcL    Hgb 8.4 (L) 12.0 - 16.0 g/dL    Hct 24.6 (L) 37.0 - 47.0 %    Platelet 210 130 - 400 x10(3)/mcL   CBC with Differential    Collection Time: 24  6:09 AM   Result Value Ref Range    WBC 12.20 (H) 4.50 - 11.50 x10(3)/mcL    Hgb 13.1 12.0 - 16.0 g/dL    Hct 36.6 (L) 37.0 - 47.0 %    Platelet 256 130 - 400 x10(3)/mcL          Assessment/Plan     43 y.o.  S/P , PPD # 3   -Continue routine postpartum care  -Anticipated d/c POD#4    Active Problem List with Overview Notes    Diagnosis Date Noted    - Fetal hydronephrosis during pregnancy, antepartum 2024    Gestational hypertension, third trimester     - Multigravida of advanced maternal age in third trimester 2024    - Chronic hypertension affecting pregnancy 2024    - Recurrent pregnancy loss in pregnant patient in third trimester, antepartum 2024    - History of prior pregnancy with IUGR  2024     S/p PPH    BP was low on the pregnancy regimen of:   procardia 90 BID, hydal 50 QID, lab 300 TID,    BP was high on pre-pregnancy regimen of:  Lab 200 TID    Meds adjusted today  to:  Procardia 60 qhs, lab 300 TID        Rashaun Sweeney MD  06/09/2024 2:04 AM

## 2024-06-10 VITALS
HEART RATE: 79 BPM | OXYGEN SATURATION: 96 % | TEMPERATURE: 99 F | DIASTOLIC BLOOD PRESSURE: 81 MMHG | RESPIRATION RATE: 16 BRPM | SYSTOLIC BLOOD PRESSURE: 147 MMHG

## 2024-06-10 PROCEDURE — 25000003 PHARM REV CODE 250: Performed by: STUDENT IN AN ORGANIZED HEALTH CARE EDUCATION/TRAINING PROGRAM

## 2024-06-10 PROCEDURE — 25000003 PHARM REV CODE 250: Performed by: OBSTETRICS & GYNECOLOGY

## 2024-06-10 RX ORDER — LABETALOL 300 MG/1
300 TABLET, FILM COATED ORAL EVERY 8 HOURS
Qty: 90 TABLET | Refills: 11 | Status: SHIPPED | OUTPATIENT
Start: 2024-06-10 | End: 2025-06-10

## 2024-06-10 RX ORDER — OXYCODONE AND ACETAMINOPHEN 5; 325 MG/1; MG/1
1 TABLET ORAL EVERY 4 HOURS PRN
Qty: 28 TABLET | Refills: 0 | Status: SHIPPED | OUTPATIENT
Start: 2024-06-10

## 2024-06-10 RX ADMIN — IBUPROFEN 600 MG: 600 TABLET, FILM COATED ORAL at 11:06

## 2024-06-10 RX ADMIN — IBUPROFEN 600 MG: 600 TABLET, FILM COATED ORAL at 06:06

## 2024-06-10 RX ADMIN — OXYCODONE HYDROCHLORIDE 10 MG: 5 TABLET ORAL at 02:06

## 2024-06-10 RX ADMIN — LABETALOL HYDROCHLORIDE 300 MG: 200 TABLET, FILM COATED ORAL at 08:06

## 2024-06-10 RX ADMIN — DOCUSATE SODIUM 200 MG: 100 CAPSULE, LIQUID FILLED ORAL at 08:06

## 2024-06-10 RX ADMIN — SERTRALINE HYDROCHLORIDE 50 MG: 50 TABLET ORAL at 08:06

## 2024-06-10 RX ADMIN — PRENATAL VITAMINS-IRON FUMARATE 27 MG IRON-FOLIC ACID 0.8 MG TABLET 1 TABLET: at 08:06

## 2024-06-10 RX ADMIN — ACETAMINOPHEN 1000 MG: 500 TABLET ORAL at 04:06

## 2024-06-10 RX ADMIN — SIMETHICONE 80 MG: 80 TABLET, CHEWABLE ORAL at 08:06

## 2024-06-10 RX ADMIN — LABETALOL HYDROCHLORIDE 300 MG: 200 TABLET, FILM COATED ORAL at 02:06

## 2024-06-10 RX ADMIN — OXYCODONE 5 MG: 5 TABLET ORAL at 07:06

## 2024-06-10 NOTE — DISCHARGE SUMMARY
"Delivery Discharge Summary  Obstetrics      Primary OB Clinician: Casey Coreas MD    Discharge Provider: Casey Coreas MD    Admission date: 2024  Discharge date: 06/10/2024    Admit Dx:   Discharge Dx:    Patient Active Problem List   Diagnosis    - Multigravida of advanced maternal age in third trimester    - Chronic hypertension affecting pregnancy    - Recurrent pregnancy loss in pregnant patient in third trimester, antepartum    - History of prior pregnancy with IUGR     Gestational hypertension, third trimester    - Fetal hydronephrosis during pregnancy, antepartum       Procedure:  section    Hospital Course:  Sonja Hoff is a 43 y.o. now  who was admitted on 2024 for delivery. Patient delivered a viable  via  section. Please see delivery note for further details. Pt was in stable condition post delivery and was transferred to the Mother-Baby Unit. Her postpartum course was uncomplicated. On the date of discharge, patient's pain is controlled with oral pain medications. She is tolerating ambulation without SOB or CP, and PO diet without N/V. Reported lochia is within the normal range. Pt in stable condition and ready for discharge.     Pertinent studies:  Postpartum CBC  Lab Results   Component Value Date    WBC 18.61 (H) 2024    HGB 8.4 (L) 2024    HCT 24.6 (L) 2024    MCV 92.8 2024     2024       Delivery:    Episiotomy:     Lacerations:     Repair suture:     Repair # of packets:     Blood loss (ml):       Birth information:  YOB: 2022   Time of birth: 5:48 PM   Sex: female   Delivery type: , Low Transverse   Gestational Age: 36w0d     Measurements    Weight: 2510 g  Weight (lbs): 5 lb 8.5 oz  Length: 45.7 cm  Length (in): 18"  Head circumference: 32.3 cm         Delivery Clinician: Delivery Providers    Delivering clinician: Casey Coreas MD          Additional  information:  Forceps:  "   Vacuum:    Breech:    Observed anomalies      Living?:     Apgars    Living status: Living  Apgar Component Scores:  1 min.:  5 min.:  10 min.:  15 min.:  20 min.:    Skin color:  1  1       Heart rate:  2  2       Reflex irritability:  2  2       Muscle tone:  1  2       Respiratory effort:  2  2       Total:  8  9       Apgars assigned by: EDUARD SADLER         Placenta: Delivered:       appearance    Disposition: To home, self care    Follow Up: 2 weeks    Patient Instructions:   1. Call the office for any bleeding >2 pads/hour for >2 hours, temperature >100.4, pain that is uncontrolled with medications, or for any other concerns.  2. Pelvic rest and no tub baths x 6 weeks.  3. No driving while on narcotics.

## 2024-06-10 NOTE — PLAN OF CARE
Problem: Adult Inpatient Plan of Care  Goal: Plan of Care Review  Outcome: Progressing  Goal: Patient-Specific Goal (Individualized)  Outcome: Progressing  Goal: Absence of Hospital-Acquired Illness or Injury  Outcome: Progressing  Goal: Optimal Comfort and Wellbeing  Outcome: Progressing  Goal: Readiness for Transition of Care  Outcome: Progressing     Problem:  Fall Injury Risk  Goal: Absence of Fall, Infant Drop and Related Injury  Outcome: Progressing     Problem: Infection  Goal: Absence of Infection Signs and Symptoms  Outcome: Progressing     Problem: Wound  Goal: Optimal Coping  Outcome: Progressing  Goal: Optimal Functional Ability  Outcome: Progressing  Goal: Absence of Infection Signs and Symptoms  Outcome: Progressing  Goal: Improved Oral Intake  Outcome: Progressing  Goal: Optimal Pain Control and Function  Outcome: Progressing  Goal: Skin Health and Integrity  Outcome: Progressing  Goal: Optimal Wound Healing  Outcome: Progressing     Problem: Postpartum ( Delivery)  Goal: Successful Parent Role Transition  Outcome: Progressing  Goal: Hemostasis  Outcome: Progressing  Goal: Effective Bowel Elimination  Outcome: Progressing  Goal: Fluid and Electrolyte Balance  Outcome: Progressing  Goal: Absence of Infection Signs and Symptoms  Outcome: Progressing  Goal: Anesthesia/Sedation Recovery  Outcome: Progressing  Goal: Optimal Pain Control and Function  Outcome: Progressing  Goal: Nausea and Vomiting Relief  Outcome: Progressing  Goal: Effective Urinary Elimination  Outcome: Progressing  Goal: Effective Oxygenation and Ventilation  Outcome: Progressing

## 2024-06-10 NOTE — LACTATION NOTE
This note was copied from a baby's chart.  Experienced mom says her milk is increased and baby is feeding well at the breast. Mom says baby sometimes acts hungry as soon as they put her down. Discussed LPI behaviors and explained that since she is early she may stop eating because she is tired but not necessarily full. Encouraged mom to pump after nursing and offer her expressed milk or formula after nursing.     Mom has a hand pump and our double electric pump at bedside. Discussed milk storage and labeling as well as average feeding volumes based on age.     Formula prep discussed. Handout given. Answered moms questions.     Mom plans to get an electric pump through insurance. Discharge instructions reviewed. Answered moms questions. Verbalized understanding of all. Offered assistance if needed.       [facility]   [phone]   Discharge Instructions      Feed baby when you notice early hunger cues, such as rooting, hand to mouth, smacking lips, sticking out tongue.  Crying is a late sign of hunger. Try to get baby to the breast before crying begins. (page 2 & 39 of booklet)      Most newborns need to eat at least 8 times in a 24-hour period. Feeding often will help develop milk supply.  Feed baby anytime hunger cues are noticed, and wake baby if needed to ensure 8 or more feeds per 24 hour period. (pg. 24)      Cluster feeding is normal: baby may nurse very often for several times in a row.  This commonly occurs in the evening or early part of the night. (pg. 4)       Allow your baby to finish one side before offering the other. You can try to burp baby and then offer the other breast if he/she seems to still be hungry.       Skin to skin contact can help a sleepy baby want to nurse. Babies held skin to skin, often nurse better and longer. Skin to skin increases moms milk-making hormone levels as well. Skin to skin is calming for mom and baby. (pg. 23)      In the early days, the number of wet and  dirty diapers baby has each day can help you know if baby is getting enough to eat.  Refer to your breastfeeding booklet (pgs. 2-12) to see how many wet/dirty diapers baby should be having each day.  Contact babys pediatrician or lactation, if baby is not having enough wet and dirty diapers.      It is best to avoid pacifiers and bottles for the first 4 weeks, while getting breastfeeding established.        Back to work or school:  4 weeks is a good time to start pumping after morning feeds, in order to store milk for baby. Although you may pump before if needed. Week 4 is also a good time to introduce a bottle of pumped milk to baby, if you will be going back to work or school.  This can be done sooner if needed. (Refer to pgs 25-27 for pumping and milk storage)       When baby is latched deeply to your breast, you should feel a strong tugging or pulling sensation. If your nipples are sore, you may feel mild discomfort with initial latch that eases quickly.  If there is pain, try to adjust the latch. Make sure your baby opens his mouth wide to latch on. Scan the QR code on page 18 for a video on latching.       Listen for swallowing when baby is nursing. This indicates your baby is transferring that milk!      Your milk will increase between days 3-5.  Frequent feeds can help prevent engorgement.      If your breasts begin to get engorged, refer to pages 20 & 21 for tips on management.  Feed often to help keep your breasts comfortable. If baby is not able to remove milk from your breasts, pumping will be needed to relieve breast fullness and build milk supply. If baby is removing milk well from your breasts, but they are still uncomfortably full after, You may need to pump for comfort, meaning just pump enough to relieve the fullness.      No soap or lotions to the nipples except for medical grade lanolin or nipple cream for soreness.        All babies go through growth spurts. The first one is generally around  2-3 weeks. If your baby starts to nurse a lot more than usual, this is likely the reason.  Growth spurts happen every so often, and usually lasts for 3-5 days.      Remember to check the safety of any medications, prescription or non-prescription, and herbals, before you take them.  Your babys pediatrician is the best one to confirm the safety of the medication while you are breastfeeding. You may also the lactation department, or the [facility] at [phone], to check the safety of a medication. (pg 31)      Call with any questions or concerns. Dont wait --ask for help early. Breastfeeding Resources can be found on pages 33-35 of your Breastfeeding Booklet given to you in the hospital.

## 2024-06-10 NOTE — NURSING
C/s delivery and pre-e Discharge instructions reviewed with pt. Pt verbalized understanding. Pt stated she will schedule a BP follow up jeremy with DR. Coreas next week and has her BP medication at home.

## 2024-06-14 ENCOUNTER — PATIENT MESSAGE (OUTPATIENT)
Dept: ADMINISTRATIVE | Facility: OTHER | Age: 44
End: 2024-06-14
Payer: MEDICAID

## (undated) DEVICE — ADHESIVE DERMABOND ADVANCED

## (undated) DEVICE — SUT 2-0 VICRYL / CT-1

## (undated) DEVICE — PAD SANITARY OB STERILE

## (undated) DEVICE — SUT 2/0 27IN PLAIN GUT CT

## (undated) DEVICE — SEE MEDLINE ITEM 157117

## (undated) DEVICE — STAPLER SKIN SUBCUTICULAR

## (undated) DEVICE — BINDER ABDOM 4PANEL 12IN LG/XL

## (undated) DEVICE — TRAY CATH FOL SIL URIMTR 16FR

## (undated) DEVICE — Device

## (undated) DEVICE — BULB SYRINGE EAR IRRIGATION

## (undated) DEVICE — SOL WATER STRL IRR 1000ML

## (undated) DEVICE — CAP BABY BEANIE

## (undated) DEVICE — SOL NACL IRR 1000ML BTL

## (undated) DEVICE — PAD UNDERPAD 30X30

## (undated) DEVICE — SUT MONOCRYL 4-0 PS-1 UND

## (undated) DEVICE — ELECTRODE REM PLYHSV RETURN 9

## (undated) DEVICE — SEE MEDLINE ITEM 156931

## (undated) DEVICE — SUT CTD VICRYL 0 UND BR CT